# Patient Record
Sex: FEMALE | Race: WHITE | NOT HISPANIC OR LATINO | Employment: FULL TIME | URBAN - METROPOLITAN AREA
[De-identification: names, ages, dates, MRNs, and addresses within clinical notes are randomized per-mention and may not be internally consistent; named-entity substitution may affect disease eponyms.]

---

## 2017-01-06 ENCOUNTER — GENERIC CONVERSION - ENCOUNTER (OUTPATIENT)
Dept: OTHER | Facility: OTHER | Age: 57
End: 2017-01-06

## 2019-01-10 ENCOUNTER — OFFICE VISIT (OUTPATIENT)
Dept: FAMILY MEDICINE CLINIC | Facility: CLINIC | Age: 59
End: 2019-01-10
Payer: COMMERCIAL

## 2019-01-10 VITALS
HEIGHT: 69 IN | SYSTOLIC BLOOD PRESSURE: 150 MMHG | WEIGHT: 209 LBS | TEMPERATURE: 98.2 F | BODY MASS INDEX: 30.96 KG/M2 | DIASTOLIC BLOOD PRESSURE: 102 MMHG

## 2019-01-10 DIAGNOSIS — E78.5 HYPERLIPIDEMIA, UNSPECIFIED HYPERLIPIDEMIA TYPE: ICD-10-CM

## 2019-01-10 DIAGNOSIS — I10 ESSENTIAL HYPERTENSION: ICD-10-CM

## 2019-01-10 DIAGNOSIS — F43.9 STRESS: ICD-10-CM

## 2019-01-10 DIAGNOSIS — E66.9 OBESITY (BMI 30-39.9): Primary | ICD-10-CM

## 2019-01-10 DIAGNOSIS — R42 LIGHTHEADEDNESS: ICD-10-CM

## 2019-01-10 PROCEDURE — 99214 OFFICE O/P EST MOD 30 MIN: CPT | Performed by: FAMILY MEDICINE

## 2019-01-10 RX ORDER — HYDROCHLOROTHIAZIDE 12.5 MG/1
12.5 TABLET ORAL DAILY
Qty: 30 TABLET | Refills: 5 | Status: SHIPPED | OUTPATIENT
Start: 2019-01-10 | End: 2019-05-09

## 2019-01-10 NOTE — PATIENT INSTRUCTIONS
Here to review labs showing elevated LDL at 172, since HDL is high, will give 6 months of diet and lifestyle changes to see if LDL can be decreased and will rec  Nutrition and weight counseling for patient  Start HCTZ 12 5 mg daily and recheck in 1 month  Lose weight and call if worsening dizziness or lightheadedness  Consider cholesterol agent if lipid panel not improved in 6 months  Gave patient low cholesterol diet sheet

## 2019-01-10 NOTE — PROGRESS NOTES
Assessment/Plan:  Chief Complaint   Patient presents with    Dizziness     for about 1 month; no other symptoms indicated    Immunizations     flu     Patient Instructions   Here to review labs showing elevated LDL at 172, since HDL is high, will give 6 months of diet and lifestyle changes to see if LDL can be decreased and will rec  Nutrition and weight counseling for patient  Start HCTZ 12 5 mg daily and recheck in 1 month  Lose weight and call if worsening dizziness or lightheadedness  Consider cholesterol agent if lipid panel not improved in 6 months  Gave patient low cholesterol diet sheet  No problem-specific Assessment & Plan notes found for this encounter  Diagnoses and all orders for this visit:    Obesity (BMI 30-39 9)  -     CBC and differential; Future    Essential hypertension  -     hydrochlorothiazide (HYDRODIURIL) 12 5 mg tablet; Take 1 tablet (12 5 mg total) by mouth daily  -     CBC and differential; Future    Hyperlipidemia, unspecified hyperlipidemia type  -     Comprehensive metabolic panel; Future  -     Lipid Panel with Direct LDL reflex; Future  -     CBC and differential; Future    Lightheadedness  -     CBC and differential; Future    Stress  -     CBC and differential; Future          Subjective:      Patient ID: Ines Carter is a 66 y o  female  Dizziness (for about 1 month; no other symptoms indicated)  Immunizations (flu)    Stress at home and work and daughter leaving for [place] and house is a mess and takes care of her mother   has been checking her BP and consistently over 140/90  Here to review labs  Wants to see a nutritionist  Labs show hyperlipidemia  LDL is 172  Mother with hyperlipidemia  Currently No cp or sob, or ha, no visual changes   Gets headaches daily however        The following portions of the patient's history were reviewed and updated as appropriate: allergies, current medications, past family history, past medical history, past social history, past surgical history and problem list     Review of Systems   Constitutional:        Obesity   HENT: Negative  Eyes: Negative  Respiratory: Negative  Cardiovascular: Negative  Gastrointestinal: Negative  Endocrine: Negative  Genitourinary: Negative  Musculoskeletal: Negative  Skin: Negative  Allergic/Immunologic: Negative  Neurological: Negative  Hematological: Negative  Psychiatric/Behavioral:        Stress         Objective:      BP (!) 150/102   Temp 98 2 °F (36 8 °C) (Tympanic)   Ht 5' 9" (1 753 m)   Wt 94 8 kg (209 lb)   BMI 30 86 kg/m²          Physical Exam   Constitutional: She is oriented to person, place, and time  She appears well-developed and well-nourished  obesity   HENT:   Head: Normocephalic and atraumatic  Right Ear: External ear normal    Left Ear: External ear normal    Nose: Nose normal    Mouth/Throat: Oropharynx is clear and moist    Eyes: Pupils are equal, round, and reactive to light  Conjunctivae and EOM are normal    Neck: Normal range of motion  Neck supple  Cardiovascular: Normal rate, regular rhythm, normal heart sounds and intact distal pulses  Pulmonary/Chest: Effort normal and breath sounds normal    Musculoskeletal: Normal range of motion  Neurological: She is alert and oriented to person, place, and time  She has normal reflexes  Skin: Skin is warm and dry  Psychiatric: She has a normal mood and affect   Her behavior is normal

## 2019-01-25 ENCOUNTER — OFFICE VISIT (OUTPATIENT)
Dept: BARIATRICS | Facility: CLINIC | Age: 59
End: 2019-01-25
Payer: COMMERCIAL

## 2019-01-25 VITALS
RESPIRATION RATE: 18 BRPM | TEMPERATURE: 98.4 F | HEART RATE: 88 BPM | WEIGHT: 210.9 LBS | BODY MASS INDEX: 31.24 KG/M2 | SYSTOLIC BLOOD PRESSURE: 130 MMHG | DIASTOLIC BLOOD PRESSURE: 84 MMHG | HEIGHT: 69 IN

## 2019-01-25 DIAGNOSIS — E66.9 OBESITY (BMI 30-39.9): ICD-10-CM

## 2019-01-25 DIAGNOSIS — R73.01 ELEVATED FASTING GLUCOSE: ICD-10-CM

## 2019-01-25 DIAGNOSIS — R63.5 ABNORMAL WEIGHT GAIN: ICD-10-CM

## 2019-01-25 DIAGNOSIS — I10 HYPERTENSION, UNSPECIFIED TYPE: ICD-10-CM

## 2019-01-25 DIAGNOSIS — E66.9 OBESITY, CLASS I, BMI 30-34.9: Primary | ICD-10-CM

## 2019-01-25 DIAGNOSIS — G47.30 SLEEP APNEA, UNSPECIFIED TYPE: ICD-10-CM

## 2019-01-25 PROBLEM — E66.811 OBESITY, CLASS I, BMI 30-34.9: Status: ACTIVE | Noted: 2019-01-25

## 2019-01-25 PROCEDURE — 99242 OFF/OP CONSLTJ NEW/EST SF 20: CPT | Performed by: PHYSICIAN ASSISTANT

## 2019-01-25 NOTE — ASSESSMENT & PLAN NOTE
-Discussed options of [facility]ensive Lifestyle Intervention Program, Very Low Calorie Diet-VLCD and Conservative Program and the role of weight loss medications   -Initial weight loss goal of 5-10% weight loss for improved health  -Screening labs  Recommend checking lab coverage before having labs drawn   -lipid and cmp reviewed from 1/4/19 all within acceptable limits except elevated fasting glucose, cholesterol and ldl   Needs tsh, a1c, and fasting insulin   -Patient is interested in pursuing [facility]vention Program

## 2019-01-25 NOTE — PROGRESS NOTES
Assessment/Plan:    Obesity, Class I, BMI 30-34 9  -Discussed options of [facility] Lifestyle Intervention Program, Very Low Calorie Diet-VLCD and Conservative Program and the role of weight loss medications   -Initial weight loss goal of 5-10% weight loss for improved health  -Screening labs  Recommend checking lab coverage before having labs drawn   -lipid and cmp reviewed from 1/4/19 all within acceptable limits except elevated fasting glucose, cholesterol and ldl  Needs tsh, a1c, and fasting insulin   -Patient is interested in pursuing [facility]estyle Intervention Program        Abnormal weight gain  See obesity plan     Sleep apnea  -encouraged continued use of CPAP machine  -may improve with 20-30% weight loss      Hypertension  Taking hctz  -should improve with weight loss, dietary, and lifestyle changes  -continue management with prescribing provider        Follow up for [facility]     Diagnoses and all orders for this visit:    Obesity, Class I, BMI 30-34 9  -     TSH, 3rd generation with Free T4 reflex; Future  -     Hemoglobin A1C; Future  -     Insulin, fasting; Future    Obesity (BMI 30-39 9)  -     Ambulatory referral to Weight Management    Abnormal weight gain  -     TSH, 3rd generation with Free T4 reflex; Future  -     Hemoglobin A1C; Future  -     Insulin, fasting; Future    Sleep apnea, unspecified type  -     TSH, 3rd generation with Free T4 reflex; Future  -     Hemoglobin A1C; Future  -     Insulin, fasting; Future    Hypertension, unspecified type  -     TSH, 3rd generation with Free T4 reflex; Future  -     Hemoglobin A1C; Future  -     Insulin, fasting; Future    Elevated fasting glucose  -     TSH, 3rd generation with Free T4 reflex; Future  -     Hemoglobin A1C; Future  -     Insulin, fasting; Future          Subjective:   Chief Complaint   Patient presents with    Consult     Pt is in the office for mwm consult  Patient ID: Binta Buchanan  is a 66 y o  female with excess weight/obesity here to pursue weight management  Past Medical History:   Diagnosis Date    Hypertension     Sleep apnea        HPI:  Obesity/Excess Weight:  Severity: Moderate  Onset: Within the last year     Modifiers: hasn't tried to lose weight in the past   Contributing factors: Menopause  Associated symptoms: comorbid conditions    Goals: get off blood pressure medication   Hydration: 32 ounces of water, 2 cups of black coffee, 1 cup of green tea   Alcohol: 4 glasses of wine per week     The following portions of the patient's history were reviewed and updated as appropriate: allergies, current medications, past family history, past medical history, past social history, past surgical history and problem list     Review of Systems   HENT: Negative for sore throat  Respiratory: Negative for cough and shortness of breath  Cardiovascular: Negative for chest pain and palpitations  Gastrointestinal: Negative for abdominal pain, constipation, diarrhea, nausea and vomiting  Denies GERD   Endocrine: Negative for cold intolerance and heat intolerance  Genitourinary: Negative for dysuria  Musculoskeletal: Negative for arthralgias and back pain  Skin: Negative for rash  Neurological: Negative for headaches  Psychiatric/Behavioral: Negative for suicidal ideas (denies HI)  Denies depression or anxiety       Objective:    /84 (BP Location: Left arm, Patient Position: Sitting, Cuff Size: Large)   Pulse 88   Temp 98 4 °F (36 9 °C) (Tympanic)   Resp 18   Ht 5' 9" (1 753 m)   Wt 95 7 kg (210 lb 14 4 oz)   BMI 31 14 kg/m²     Physical Exam   Nursing note and vitals reviewed  Constitutional   General appearance: Abnormal   well developed and obese  Eyes No conjunctival pallor  Ears, Nose, Mouth, and Throat Oral mucosa moist    Pulmonary   Respiratory effort: No increased work of breathing or signs of respiratory distress      Auscultation of lungs: Clear to auscultation, equal breath sounds bilaterally, no wheezes, no rales, no rhonci  Cardiovascular   Auscultation of heart: Normal rate and rhythm, normal S1 and S2, without murmurs  Examination of extremities for edema and/or varicosities: Normal   no edema  Abdomen   Abdomen: Abnormal   The abdomen was obese  Bowel sounds were normal  The abdomen was soft and nontender     Musculoskeletal   Gait and station: Normal     Psychiatric   Orientation to person, place and time: Normal     Affect: appropriate

## 2019-01-25 NOTE — ASSESSMENT & PLAN NOTE
Taking hctz  -should improve with weight loss, dietary, and lifestyle changes  -continue management with prescribing provider

## 2019-01-31 ENCOUNTER — OFFICE VISIT (OUTPATIENT)
Dept: BARIATRICS | Facility: CLINIC | Age: 59
End: 2019-01-31

## 2019-01-31 VITALS — HEIGHT: 69 IN | BODY MASS INDEX: 30.75 KG/M2 | WEIGHT: 207.6 LBS

## 2019-01-31 DIAGNOSIS — R63.5 ABNORMAL WEIGHT GAIN: ICD-10-CM

## 2019-01-31 LAB
HBA1C MFR BLD: 5.3 % OF TOTAL HGB
INSULIN SERPL-ACNC: 5.7 UIU/ML (ref 2–19.6)
TSH SERPL-ACNC: 2.71 MIU/L (ref 0.4–4.5)

## 2019-01-31 PROCEDURE — RECHECK

## 2019-01-31 PROCEDURE — WMPRO12

## 2019-01-31 NOTE — PROGRESS NOTES
Weight Management Medical Nutrition Assessment    Neisha Quiroga is here for [facility] new start  Seen by PA last week  Since that time has made changes to her intake and has lost 3 3 lbs Goals for the program include getting medication as well as losing 30 lbs  Discussed realistic goals for 12 wks and emphasized recommendation of 5-10%  Food recall reveals good sources of protein but she is not yet measuring portions  Also reports grazing most of the day  Discussed importance of structured interval eating vs grazing  Having a defined snack will be beneficial for her  She will also work on water intake  Currently exercising at a gym 2x/wk but wanting to incorporate some home exercise as well  Anthropometric Measurements  Start Weight (lbs): 207 6 lbs   Ideal Body Weight (lbs): 145 lbs  Goal Weight (lbs):  lbs  Highest: 208 lbs  Lowest: 170-175 lbs  UBW: 180 lbs    Weight Loss History  Previous weight loss attempts: n/a    Food and Nutrition Related History  Wake up: 7:00  Bed Time:10-11    Food Recall  Breakfast:9:00: siggis 120, 15, banana OR 1 rye toast, 1 pb   Snack: almonds, fruit, fruit snacks, string cheese  Lunch: 11:30: ~35 calbag salad, 2 5 oz tuna or 3-4 oz chicken/filet, ~1/4 cup cheese, small amount of dressing  Snack: grazing as above, Juice Plus  Dinner: 6:30: ~3-4 oz protein, ~3/4 cup carb, 1 cup veg  Snack: skip    Beverages: water, ~16 oz coffee, unsweetened green tea, red wine   Volume of beverage intake: 32 oz water, 6 oz wine (more on the w/e)    Weekends: Improved, because home  Cravings: n/a  Trouble area of day: midafternoon    Frequency of Eating out: 1-2x/wk  Food restrictions: dislike garlic, fish  Cooking:spouse  Food Shopping: self    Physical Activity Intake  Activity:Strength Training and cardio  Frequency:2x/wk  Physical limitations/barriers to exercise: n/a    Estimated Needs  Energy  [place]: BMR: 1665     X 1 3 -1000 =1165  [employer] [place] [employer] Needs:  BMR : 0328  1-2# loss weekly sedentary:  915-1415       1-2# loss weekly lightly active: 1490-2046  Protein: 79-99      (1 2-1 5g/kg IBW)  Fluid: 72 oz  (35mL/kg IBW)    Nutrition Diagnosis  Yes; Overweight/obesity  related to Excess energy intake as evidenced by  BMI more than normative standard for age and sex (obesity-grade I 26-30  9)       Nutrition Intervention    Nutrition Prescription  Calories:7507-7422  Protein:    Meal Plan  Breakfast: 250, 12-15  Snack: 100-150, 5-10  Lunch: 350-500, 27-35  Snack 150, 5-10  Dinner: 350-500, 25-33  Snack: skip    Nutrition Education:    [facility] Manual  Calorie controlled menu  Lean protein food choices  Healthy snack options  Food journaling tips    Nutrition Counseling:  Strategies: meal planning, portion sizes, healthy snack choices, hydration, fiber intake, protein intake, exercise, food journal    Monitoring and Evaluation:  Evaluation criteria:  Energy Intake  Meet protein needs  Maintain adequate hydration  Monitor weekly weight  Meal planning/preparation  Food journal   Decreased portions at mealtimes and snacks  Physical activity     Barriers to learning:none  Readiness to change: Action  Comprehension: very good  Expected Compliance: very good

## 2019-02-07 ENCOUNTER — CLINICAL SUPPORT (OUTPATIENT)
Dept: BARIATRICS | Facility: CLINIC | Age: 59
End: 2019-02-07

## 2019-02-07 VITALS — WEIGHT: 210.4 LBS | HEIGHT: 69 IN | BODY MASS INDEX: 31.16 KG/M2

## 2019-02-07 DIAGNOSIS — R63.5 ABNORMAL WEIGHT GAIN: Primary | ICD-10-CM

## 2019-02-07 PROCEDURE — RECHECK

## 2019-02-08 ENCOUNTER — TELEPHONE (OUTPATIENT)
Dept: FAMILY MEDICINE CLINIC | Facility: CLINIC | Age: 59
End: 2019-02-08

## 2019-02-14 ENCOUNTER — CLINICAL SUPPORT (OUTPATIENT)
Dept: BARIATRICS | Facility: CLINIC | Age: 59
End: 2019-02-14

## 2019-02-14 VITALS — WEIGHT: 211.4 LBS | BODY MASS INDEX: 31.31 KG/M2 | HEIGHT: 69 IN

## 2019-02-14 DIAGNOSIS — R63.5 ABNORMAL WEIGHT GAIN: Primary | ICD-10-CM

## 2019-02-14 PROCEDURE — RECHECK

## 2019-02-19 ENCOUNTER — OFFICE VISIT (OUTPATIENT)
Dept: BARIATRICS | Facility: CLINIC | Age: 59
End: 2019-02-19

## 2019-02-19 VITALS — BODY MASS INDEX: 30.79 KG/M2 | WEIGHT: 207.9 LBS | HEIGHT: 69 IN

## 2019-02-19 DIAGNOSIS — R63.5 ABNORMAL WEIGHT GAIN: Primary | ICD-10-CM

## 2019-02-19 PROCEDURE — RECHECK

## 2019-02-19 NOTE — PROGRESS NOTES
Weight Management Medical Nutrition Assessment    Kellie Fontenot is here for f/u  Current wt 207 9 lbs, stable over the last 3 wks but loss of 3 5 lbs x 1 wk  After starting program had initial gain of 3 8 lbs  Doing better with her water intake, drinking much more than prior and notices positive changes  No longer grazing throughout the day and instead having consistent snacks which have also been helpful  Has begun walking 15 minutes per day at work in addition to the gym 2x/wk  Food logging on paper and has decreased portion size of carb at dinner  Anthropometric Measurements  Start Weight (lbs): 207 6 lbs  Today's Weight: 207 9 lbs   Ideal Body Weight (lbs): 145 lbs  Goal Weight (lbs):  lbs  Highest: 211 lbs  Lowest: 170-175 lbs  UBW: 180 lbs    Weight Loss History  Previous weight loss attempts: n/a    Food and Nutrition Related History  Wake up: 7:00  Bed Time:10-11    Food Recall  Breakfast:9:00: siggis 120, 15 OR 1 rye toast, 1 pb   Snack: 1/4 cup cashews or 2 crackers w/guac single serving  Lunch: 11:30: soup with protein  Snack: as above  Dinner: 6:30: ~3-4 oz lean protein, ~1/2 cup carb, >1 cup veg  Snack: skip    Beverages: water, ~16 oz coffee, unsweetened green tea, red wine   Volume of beverage intake: 64 oz water, 6 oz wine  On w/e    Weekends: Improved, because home  Cravings: n/a  Trouble area of day: midafternoon    Frequency of Eating out: 1-2x/wk  Food restrictions: dislike garlic, fish  Cooking:spouse  Food Shopping: self    Physical Activity Intake  Activity:Strength Training and cardio, also walking 15 minutes at lunch  Frequency:2x/wk  Physical limitations/barriers to exercise: n/a    Estimated Needs  Energy  [place]: BMR: 1665     X 1 3 -1000 =1165  [employer] [place] Energy Needs:  BMR : 0933  1-2# loss weekly sedentary:  915-1415       1-2# loss weekly lightly active: 2029-9430  Protein: 79-99      (1 2-1 5g/kg IBW)  Fluid: 72 oz  (35mL/kg IBW)    Nutrition Diagnosis  Yes; Overweight/obesity  related to Excess energy intake as evidenced by  BMI more than normative standard for age and sex (obesity-grade I 26-30  9)       Nutrition Intervention    Nutrition Prescription  Calories:3469-6390  Protein:    Meal Plan  Breakfast: 250, 12-15  Snack: 100-150, 5-10  Lunch: 350-500, 27-35  Snack 150, 5-10  Dinner: 350-500, 25-33  Snack: skip    Nutrition Education:    [facility] Manual  Calorie controlled menu  Lean protein food choices  Healthy snack options  Food journaling tips    Nutrition Counseling:  Strategies: meal planning, portion sizes, healthy snack choices, hydration, fiber intake, protein intake, exercise, food journal    Monitoring and Evaluation:  Evaluation criteria:  Energy Intake  Meet protein needs  Maintain adequate hydration  Monitor weekly weight  Meal planning/preparation  Food journal   Decreased portions at mealtimes and snacks  Physical activity     Barriers to learning:none  Readiness to change: Action  Comprehension: very good  Expected Compliance: good

## 2019-02-21 ENCOUNTER — CLINICAL SUPPORT (OUTPATIENT)
Dept: BARIATRICS | Facility: CLINIC | Age: 59
End: 2019-02-21

## 2019-02-21 VITALS — HEIGHT: 69 IN | BODY MASS INDEX: 31.13 KG/M2 | WEIGHT: 210.2 LBS

## 2019-02-21 DIAGNOSIS — R63.5 ABNORMAL WEIGHT GAIN: Primary | ICD-10-CM

## 2019-02-21 PROCEDURE — RECHECK

## 2019-02-28 ENCOUNTER — CLINICAL SUPPORT (OUTPATIENT)
Dept: BARIATRICS | Facility: CLINIC | Age: 59
End: 2019-02-28

## 2019-02-28 VITALS — WEIGHT: 207 LBS | HEIGHT: 69 IN | BODY MASS INDEX: 30.66 KG/M2

## 2019-02-28 DIAGNOSIS — R63.5 ABNORMAL WEIGHT GAIN: Primary | ICD-10-CM

## 2019-02-28 PROCEDURE — RECHECK

## 2019-03-01 ENCOUNTER — OFFICE VISIT (OUTPATIENT)
Dept: FAMILY MEDICINE CLINIC | Facility: CLINIC | Age: 59
End: 2019-03-01
Payer: COMMERCIAL

## 2019-03-01 ENCOUNTER — TELEPHONE (OUTPATIENT)
Dept: FAMILY MEDICINE CLINIC | Facility: CLINIC | Age: 59
End: 2019-03-01

## 2019-03-01 VITALS
BODY MASS INDEX: 30.6 KG/M2 | WEIGHT: 206.6 LBS | HEIGHT: 69 IN | SYSTOLIC BLOOD PRESSURE: 128 MMHG | DIASTOLIC BLOOD PRESSURE: 84 MMHG

## 2019-03-01 DIAGNOSIS — E78.5 HYPERLIPIDEMIA, UNSPECIFIED HYPERLIPIDEMIA TYPE: ICD-10-CM

## 2019-03-01 DIAGNOSIS — I10 HYPERTENSION, UNSPECIFIED TYPE: ICD-10-CM

## 2019-03-01 DIAGNOSIS — E66.9 OBESITY, CLASS I, BMI 30-34.9: Primary | ICD-10-CM

## 2019-03-01 DIAGNOSIS — R42 DIZZINESS: ICD-10-CM

## 2019-03-01 PROCEDURE — 3074F SYST BP LT 130 MM HG: CPT | Performed by: FAMILY MEDICINE

## 2019-03-01 PROCEDURE — 1036F TOBACCO NON-USER: CPT | Performed by: FAMILY MEDICINE

## 2019-03-01 PROCEDURE — 3008F BODY MASS INDEX DOCD: CPT | Performed by: FAMILY MEDICINE

## 2019-03-01 PROCEDURE — 99214 OFFICE O/P EST MOD 30 MIN: CPT | Performed by: FAMILY MEDICINE

## 2019-03-01 PROCEDURE — 3079F DIAST BP 80-89 MM HG: CPT | Performed by: FAMILY MEDICINE

## 2019-03-01 NOTE — PROGRESS NOTES
Assessment/Plan:  Chief Complaint   Patient presents with    Follow-up    Hypertension     Patient Instructions   Doing well with BP med and will continue same BP med as directed  Lose weight and call if any problems  Hx of elevated LDL at 172, since HDL is high, will give 6 months of diet and lifestyle changes to see if LDL can be decreased and will rec  Continuing Nutrition and weight counseling for patient  Continue HCTZ 12 5 mg daily and recheck in 3 month  Lose weight and call if any dizziness or lightheadedness, this is much better  Consider cholesterol agent if lipid panel not improved in 6 months  Gave patient low cholesterol diet sheet in past              No problem-specific Assessment & Plan notes found for this encounter  Diagnoses and all orders for this visit:    Obesity, Class I, BMI 30-34 9    Hypertension, unspecified type    Dizziness    Hyperlipidemia, unspecified hyperlipidemia type    Other orders  -     Omega-3 Fatty Acids (FISH OIL PO); Take 2 g by mouth  -     Cholecalciferol 2000 units CAPS; Take 1 capsule by mouth          Subjective:      Patient ID: Coty Sparks is a 66 y o  female  Here for bp check and also lost 4 pounds  No cp or sob, or ha  The following portions of the patient's history were reviewed and updated as appropriate: allergies, current medications, past family history, past medical history, past social history, past surgical history and problem list     Review of Systems   Constitutional:        Obesity   HENT: Negative  Eyes: Negative  Respiratory: Negative  Cardiovascular: Negative  Gastrointestinal: Negative  Endocrine: Negative  Genitourinary: Negative  Musculoskeletal: Negative  Skin: Negative  Allergic/Immunologic: Negative  Neurological: Negative  Hematological: Negative  Psychiatric/Behavioral: Negative            Objective:      /84   Ht 5' 9" (1 753 m)   Wt 93 7 kg (206 lb 9 6 oz)   BMI 30 51 kg/m² Physical Exam   Constitutional: She is oriented to person, place, and time  She appears well-developed and well-nourished  obesity   HENT:   Head: Normocephalic and atraumatic  Right Ear: External ear normal    Left Ear: External ear normal    Nose: Nose normal    Mouth/Throat: Oropharynx is clear and moist    Eyes: Pupils are equal, round, and reactive to light  Conjunctivae and EOM are normal    Neck: Normal range of motion  Neck supple  Cardiovascular: Normal rate, regular rhythm, normal heart sounds and intact distal pulses  Pulmonary/Chest: Effort normal and breath sounds normal    Musculoskeletal: Normal range of motion  Neurological: She is alert and oriented to person, place, and time  She has normal reflexes  Skin: Skin is warm and dry  Psychiatric: She has a normal mood and affect   Her behavior is normal

## 2019-03-01 NOTE — PATIENT INSTRUCTIONS
Doing well with BP med and will continue same BP med as directed  Lose weight and call if any problems  Hx of elevated LDL at 172, since HDL is high, will give 6 months of diet and lifestyle changes to see if LDL can be decreased and will rec  Continuing Nutrition and weight counseling for patient  Continue HCTZ 12 5 mg daily and recheck in 3 month  Lose weight and call if any dizziness or lightheadedness, this is much better  Consider cholesterol agent if lipid panel not improved in 6 months   Gave patient low cholesterol diet sheet in past

## 2019-03-01 NOTE — TELEPHONE ENCOUNTER
Patient gave me a [employer] form for Dr Camacho Romero to complete with the dates 03/1-04/25  The old one is attached to just copy the information  Asking for it to be mailed to her when completed       Placed in Dr Meeta haines

## 2019-03-14 ENCOUNTER — CLINICAL SUPPORT (OUTPATIENT)
Dept: BARIATRICS | Facility: CLINIC | Age: 59
End: 2019-03-14

## 2019-03-14 VITALS — BODY MASS INDEX: 30.45 KG/M2 | WEIGHT: 205.6 LBS | HEIGHT: 69 IN

## 2019-03-14 DIAGNOSIS — R63.5 ABNORMAL WEIGHT GAIN: Primary | ICD-10-CM

## 2019-03-14 PROCEDURE — RECHECK

## 2019-03-25 ENCOUNTER — TELEPHONE (OUTPATIENT)
Dept: FAMILY MEDICINE CLINIC | Facility: CLINIC | Age: 59
End: 2019-03-25

## 2019-03-25 DIAGNOSIS — G47.33 OSA (OBSTRUCTIVE SLEEP APNEA): Primary | ICD-10-CM

## 2019-03-28 ENCOUNTER — CLINICAL SUPPORT (OUTPATIENT)
Dept: BARIATRICS | Facility: CLINIC | Age: 59
End: 2019-03-28

## 2019-03-28 VITALS — WEIGHT: 206.2 LBS | HEIGHT: 69 IN | BODY MASS INDEX: 30.54 KG/M2

## 2019-03-28 DIAGNOSIS — R63.5 ABNORMAL WEIGHT GAIN: Primary | ICD-10-CM

## 2019-03-28 PROCEDURE — RECHECK

## 2019-03-28 NOTE — TELEPHONE ENCOUNTER
Patient called and stated she needs "replacement parts" such as: face mask, filters, hose/tubing for under the nose

## 2019-04-04 ENCOUNTER — CLINICAL SUPPORT (OUTPATIENT)
Dept: BARIATRICS | Facility: CLINIC | Age: 59
End: 2019-04-04

## 2019-04-04 VITALS — BODY MASS INDEX: 30.57 KG/M2 | WEIGHT: 206.4 LBS | HEIGHT: 69 IN

## 2019-04-04 DIAGNOSIS — R63.5 ABNORMAL WEIGHT GAIN: Primary | ICD-10-CM

## 2019-04-04 PROCEDURE — RECHECK

## 2019-04-16 ENCOUNTER — OFFICE VISIT (OUTPATIENT)
Dept: BARIATRICS | Facility: CLINIC | Age: 59
End: 2019-04-16

## 2019-04-16 VITALS — BODY MASS INDEX: 30.57 KG/M2 | WEIGHT: 206.4 LBS | HEIGHT: 69 IN

## 2019-04-16 DIAGNOSIS — R63.5 ABNORMAL WEIGHT GAIN: Primary | ICD-10-CM

## 2019-04-16 PROCEDURE — RECHECK

## 2019-04-18 ENCOUNTER — PATIENT MESSAGE (OUTPATIENT)
Dept: FAMILY MEDICINE CLINIC | Facility: CLINIC | Age: 59
End: 2019-04-18

## 2019-05-09 ENCOUNTER — OFFICE VISIT (OUTPATIENT)
Dept: FAMILY MEDICINE CLINIC | Facility: CLINIC | Age: 59
End: 2019-05-09
Payer: COMMERCIAL

## 2019-05-09 VITALS
BODY MASS INDEX: 30.13 KG/M2 | SYSTOLIC BLOOD PRESSURE: 120 MMHG | DIASTOLIC BLOOD PRESSURE: 90 MMHG | HEIGHT: 69 IN | WEIGHT: 203.4 LBS

## 2019-05-09 DIAGNOSIS — R51.9 NONINTRACTABLE HEADACHE, UNSPECIFIED CHRONICITY PATTERN, UNSPECIFIED HEADACHE TYPE: ICD-10-CM

## 2019-05-09 DIAGNOSIS — E66.9 OBESITY (BMI 30-39.9): ICD-10-CM

## 2019-05-09 DIAGNOSIS — I10 HYPERTENSION, UNSPECIFIED TYPE: Primary | ICD-10-CM

## 2019-05-09 PROCEDURE — 3008F BODY MASS INDEX DOCD: CPT | Performed by: FAMILY MEDICINE

## 2019-05-09 PROCEDURE — 99214 OFFICE O/P EST MOD 30 MIN: CPT | Performed by: FAMILY MEDICINE

## 2019-05-09 RX ORDER — LISINOPRIL 10 MG/1
10 TABLET ORAL DAILY
Qty: 90 TABLET | Refills: 3 | Status: SHIPPED | OUTPATIENT
Start: 2019-05-09 | End: 2019-06-04

## 2019-06-04 ENCOUNTER — OFFICE VISIT (OUTPATIENT)
Dept: FAMILY MEDICINE CLINIC | Facility: CLINIC | Age: 59
End: 2019-06-04
Payer: COMMERCIAL

## 2019-06-04 VITALS
OXYGEN SATURATION: 99 % | WEIGHT: 200.4 LBS | DIASTOLIC BLOOD PRESSURE: 98 MMHG | HEART RATE: 70 BPM | SYSTOLIC BLOOD PRESSURE: 148 MMHG | BODY MASS INDEX: 29.68 KG/M2 | TEMPERATURE: 98.2 F | HEIGHT: 69 IN

## 2019-06-04 DIAGNOSIS — E55.9 VITAMIN D DEFICIENCY: ICD-10-CM

## 2019-06-04 DIAGNOSIS — E66.3 OVERWEIGHT (BMI 25.0-29.9): ICD-10-CM

## 2019-06-04 DIAGNOSIS — I10 ESSENTIAL HYPERTENSION: Primary | ICD-10-CM

## 2019-06-04 DIAGNOSIS — E78.5 HYPERLIPIDEMIA, UNSPECIFIED HYPERLIPIDEMIA TYPE: ICD-10-CM

## 2019-06-04 DIAGNOSIS — G47.30 SLEEP APNEA, UNSPECIFIED TYPE: ICD-10-CM

## 2019-06-04 PROCEDURE — 99214 OFFICE O/P EST MOD 30 MIN: CPT | Performed by: FAMILY MEDICINE

## 2019-06-04 PROCEDURE — 1036F TOBACCO NON-USER: CPT | Performed by: FAMILY MEDICINE

## 2019-06-04 RX ORDER — CANDESARTAN 16 MG/1
16 TABLET ORAL DAILY
Qty: 30 TABLET | Refills: 5 | Status: SHIPPED | OUTPATIENT
Start: 2019-06-04 | End: 2019-12-11 | Stop reason: SDUPTHER

## 2019-07-12 ENCOUNTER — OFFICE VISIT (OUTPATIENT)
Dept: FAMILY MEDICINE CLINIC | Facility: CLINIC | Age: 59
End: 2019-07-12
Payer: COMMERCIAL

## 2019-07-12 VITALS
BODY MASS INDEX: 29.95 KG/M2 | HEIGHT: 69 IN | HEART RATE: 76 BPM | SYSTOLIC BLOOD PRESSURE: 132 MMHG | DIASTOLIC BLOOD PRESSURE: 88 MMHG | TEMPERATURE: 97.9 F | OXYGEN SATURATION: 98 % | WEIGHT: 202.2 LBS

## 2019-07-12 DIAGNOSIS — E66.9 OBESITY, CLASS I, BMI 30-34.9: Primary | ICD-10-CM

## 2019-07-12 DIAGNOSIS — G47.30 SLEEP APNEA, UNSPECIFIED TYPE: ICD-10-CM

## 2019-07-12 DIAGNOSIS — F43.9 STRESS: ICD-10-CM

## 2019-07-12 DIAGNOSIS — I10 HYPERTENSION, UNSPECIFIED TYPE: ICD-10-CM

## 2019-07-12 PROCEDURE — 3008F BODY MASS INDEX DOCD: CPT | Performed by: FAMILY MEDICINE

## 2019-07-12 PROCEDURE — 1036F TOBACCO NON-USER: CPT | Performed by: FAMILY MEDICINE

## 2019-07-12 PROCEDURE — 99214 OFFICE O/P EST MOD 30 MIN: CPT | Performed by: FAMILY MEDICINE

## 2019-07-12 PROCEDURE — 3075F SYST BP GE 130 - 139MM HG: CPT | Performed by: FAMILY MEDICINE

## 2019-07-12 NOTE — PATIENT INSTRUCTIONS
HTN stable, continue Atacand 16 mg daily and use CPAP for CRISTELA and stress management encouraged  Lose weight as directed  Call if any problems  Recheck in 3 months

## 2019-07-12 NOTE — PROGRESS NOTES
Assessment/Plan:  Chief Complaint   Patient presents with    Hypertension     Here for 1 month re check BP     Patient Instructions   HTN stable, continue Atacand 16 mg daily and use CPAP for CRISTELA and stress management encouraged  Lose weight as directed  Call if any problems  Recheck in 3 months  No problem-specific Assessment & Plan notes found for this encounter  Diagnoses and all orders for this visit:    Obesity, Class I, BMI 30-34 9    Hypertension, unspecified type    Sleep apnea, unspecified type    Stress          Subjective:      Patient ID: Torey Leyva is a 66 y o  female  Here for bP check and has a right sided frontal headache  Has CRISTELA and uses CPAP  Trying to  lose weight and takes BP med as directed  Going to [place] for vacation  The following portions of the patient's history were reviewed and updated as appropriate: allergies, current medications, past family history, past medical history, past social history, past surgical history and problem list     Review of Systems   Constitutional:        Overweight   HENT: Negative  Eyes: Negative  Respiratory: Negative  Cardiovascular: Negative  Gastrointestinal: Negative  Endocrine: Negative  Genitourinary: Negative  Musculoskeletal: Negative  Skin: Negative  Allergic/Immunologic: Negative  Neurological: Positive for headaches  Hematological: Negative  Psychiatric/Behavioral: Negative  Objective:      /88   Pulse 76   Temp 97 9 °F (36 6 °C) (Temporal)   Ht 5' 9" (1 753 m)   Wt 91 7 kg (202 lb 3 2 oz)   SpO2 98%   BMI 29 86 kg/m²          Physical Exam   Constitutional: She is oriented to person, place, and time  She appears well-developed and well-nourished  overweight   HENT:   Head: Normocephalic and atraumatic     Right Ear: External ear normal    Left Ear: External ear normal    Nose: Nose normal    Mouth/Throat: Oropharynx is clear and moist    Eyes: Pupils are equal, round, and reactive to light  Conjunctivae and EOM are normal    Neck: Normal range of motion  Neck supple  Cardiovascular: Normal rate, regular rhythm, normal heart sounds and intact distal pulses  Pulmonary/Chest: Effort normal and breath sounds normal    Musculoskeletal: Normal range of motion  Neurological: She is alert and oriented to person, place, and time  She has normal reflexes  Skin: Skin is warm and dry  Psychiatric: She has a normal mood and affect   Her behavior is normal

## 2019-10-22 ENCOUNTER — OFFICE VISIT (OUTPATIENT)
Dept: FAMILY MEDICINE CLINIC | Facility: CLINIC | Age: 59
End: 2019-10-22
Payer: COMMERCIAL

## 2019-10-22 VITALS
DIASTOLIC BLOOD PRESSURE: 62 MMHG | OXYGEN SATURATION: 96 % | SYSTOLIC BLOOD PRESSURE: 110 MMHG | BODY MASS INDEX: 30.75 KG/M2 | HEIGHT: 69 IN | HEART RATE: 76 BPM | WEIGHT: 207.6 LBS | TEMPERATURE: 97.8 F

## 2019-10-22 DIAGNOSIS — Z23 ENCOUNTER FOR IMMUNIZATION: ICD-10-CM

## 2019-10-22 DIAGNOSIS — E66.9 OBESITY, CLASS I, BMI 30-34.9: ICD-10-CM

## 2019-10-22 DIAGNOSIS — G47.30 SLEEP APNEA, UNSPECIFIED TYPE: ICD-10-CM

## 2019-10-22 DIAGNOSIS — I10 HYPERTENSION, UNSPECIFIED TYPE: Primary | ICD-10-CM

## 2019-10-22 PROCEDURE — 3074F SYST BP LT 130 MM HG: CPT | Performed by: FAMILY MEDICINE

## 2019-10-22 PROCEDURE — 99214 OFFICE O/P EST MOD 30 MIN: CPT | Performed by: FAMILY MEDICINE

## 2019-10-22 PROCEDURE — 3008F BODY MASS INDEX DOCD: CPT | Performed by: FAMILY MEDICINE

## 2019-10-22 PROCEDURE — 90471 IMMUNIZATION ADMIN: CPT | Performed by: FAMILY MEDICINE

## 2019-10-22 PROCEDURE — 3078F DIAST BP <80 MM HG: CPT | Performed by: FAMILY MEDICINE

## 2019-10-22 PROCEDURE — 90682 RIV4 VACC RECOMBINANT DNA IM: CPT | Performed by: FAMILY MEDICINE

## 2019-10-22 RX ORDER — MELOXICAM 15 MG/1
15 TABLET ORAL DAILY
Refills: 0 | COMMUNITY
Start: 2019-08-08 | End: 2020-08-24 | Stop reason: ALTCHOICE

## 2019-10-22 NOTE — PATIENT INSTRUCTIONS
Recheck in 3 months for BP check and lose weight and use CPAP machine as directed  Call if any problems and use stress management to help lower BP  F-up with GYN and get mammogram as directed  Mammo is scheduled October 31, 2019

## 2019-10-22 NOTE — PROGRESS NOTES
Assessment/Plan:  Chief Complaint   Patient presents with    Follow-up    blood pressure check     Patient Instructions   Recheck in 3 months for BP check and lose weight and use CPAP machine as directed  Call if any problems and use stress management to help lower BP  F-up with GYN and get mammogram as directed  Mammo is scheduled October 31, 2019  No problem-specific Assessment & Plan notes found for this encounter  Diagnoses and all orders for this visit:    Hypertension, unspecified type    Sleep apnea, unspecified type    Obesity, Class I, BMI 30-34 9    Other orders  -     meloxicam (MOBIC) 15 mg tablet; Take 15 mg by mouth daily          Subjective:      Patient ID: Patricia Rojo is a 64 y o  female  Follow-up   blood pressure check     No cp or sob, or ha  Has some stress at work  Takes CPAP and also is doing well with CRISTELA  The following portions of the patient's history were reviewed and updated as appropriate: allergies, current medications, past family history, past medical history, past social history, past surgical history and problem list     Review of Systems   Constitutional:        Obesity   HENT: Negative  Eyes: Negative  Respiratory: Negative  Cardiovascular: Negative  Gastrointestinal: Negative  Endocrine: Negative  Genitourinary: Negative  Musculoskeletal: Negative  Skin: Negative  Allergic/Immunologic: Negative  Neurological: Negative  Hematological: Negative  Psychiatric/Behavioral: Negative  Objective:      /62 (BP Location: Left arm, Patient Position: Sitting, Cuff Size: Adult)   Pulse 76   Temp 97 8 °F (36 6 °C) (Temporal)   Ht 5' 9" (1 753 m)   Wt 94 2 kg (207 lb 9 6 oz)   SpO2 96%   BMI 30 66 kg/m²          Physical Exam   Constitutional: She is oriented to person, place, and time  She appears well-developed and well-nourished  obesity   HENT:   Head: Normocephalic and atraumatic     Right Ear: External ear normal    Left Ear: External ear normal    Nose: Nose normal    Mouth/Throat: Oropharynx is clear and moist    Eyes: Pupils are equal, round, and reactive to light  Conjunctivae and EOM are normal    Neck: Normal range of motion  Neck supple  Cardiovascular: Normal rate, regular rhythm, normal heart sounds and intact distal pulses  Pulmonary/Chest: Effort normal and breath sounds normal    Musculoskeletal: Normal range of motion  Neurological: She is alert and oriented to person, place, and time  She has normal reflexes  Skin: Skin is warm and dry  Psychiatric: She has a normal mood and affect   Her behavior is normal

## 2019-11-14 ENCOUNTER — OFFICE VISIT (OUTPATIENT)
Dept: FAMILY MEDICINE CLINIC | Facility: CLINIC | Age: 59
End: 2019-11-14
Payer: COMMERCIAL

## 2019-11-14 VITALS
HEIGHT: 69 IN | TEMPERATURE: 97.9 F | DIASTOLIC BLOOD PRESSURE: 84 MMHG | HEART RATE: 101 BPM | BODY MASS INDEX: 30.84 KG/M2 | OXYGEN SATURATION: 98 % | WEIGHT: 208.2 LBS | SYSTOLIC BLOOD PRESSURE: 138 MMHG

## 2019-11-14 DIAGNOSIS — R05.9 COUGH: ICD-10-CM

## 2019-11-14 DIAGNOSIS — R09.81 NASAL CONGESTION: ICD-10-CM

## 2019-11-14 DIAGNOSIS — I10 HYPERTENSION, UNSPECIFIED TYPE: Primary | ICD-10-CM

## 2019-11-14 DIAGNOSIS — J32.9 SINUSITIS, UNSPECIFIED CHRONICITY, UNSPECIFIED LOCATION: ICD-10-CM

## 2019-11-14 PROCEDURE — 1036F TOBACCO NON-USER: CPT | Performed by: FAMILY MEDICINE

## 2019-11-14 PROCEDURE — 99213 OFFICE O/P EST LOW 20 MIN: CPT | Performed by: FAMILY MEDICINE

## 2019-11-14 RX ORDER — AZITHROMYCIN 250 MG/1
TABLET, FILM COATED ORAL
Qty: 6 TABLET | Refills: 0 | Status: SHIPPED | OUTPATIENT
Start: 2019-11-14 | End: 2019-11-19

## 2019-11-14 NOTE — PATIENT INSTRUCTIONS
Rest and fluids, call if worse, start abx and Dimetapp DM cold and cough, may use humidifier prn  Flonase prn sinusitis  Take BP med as directed

## 2019-11-14 NOTE — PROGRESS NOTES
Assessment/Plan:  Chief Complaint   Patient presents with    Cold Like Symptoms     nasal congestion, headache, sinus issues which started 1 week ago  Patient Instructions   Rest and fluids, call if worse, start abx and Dimetapp DM cold and cough, may use humidifier prn  Flonase prn sinusitis  Take BP med as directed  No problem-specific Assessment & Plan notes found for this encounter  Diagnoses and all orders for this visit:    Hypertension, unspecified type    Cough  -     azithromycin (ZITHROMAX) 250 mg tablet; Take 2 tablets today then 1 tablet daily x 4 days    Sinusitis, unspecified chronicity, unspecified location  -     azithromycin (ZITHROMAX) 250 mg tablet; Take 2 tablets today then 1 tablet daily x 4 days    Nasal congestion          Subjective:      Patient ID: Chinoy Richard is a 64 y o  female  Cold Like Symptoms (nasal congestion, headache, sinus issues which started 1 week ago )      The following portions of the patient's history were reviewed and updated as appropriate: allergies, current medications, past family history, past medical history, past social history, past surgical history and problem list     Review of Systems   Constitutional: Negative  HENT: Positive for congestion, sinus pressure and sinus pain  Eyes: Negative  Respiratory: Positive for cough  Cardiovascular: Negative  Gastrointestinal: Negative  Endocrine: Negative  Genitourinary: Negative  Musculoskeletal: Negative  Skin: Negative  Allergic/Immunologic: Negative  Neurological: Positive for headaches  Hematological: Negative  Psychiatric/Behavioral: Negative  Objective:      /84   Pulse 101   Temp 97 9 °F (36 6 °C) (Temporal)   Ht 5' 9" (1 753 m)   Wt 94 4 kg (208 lb 3 2 oz)   SpO2 98%   BMI 30 75 kg/m²          Physical Exam   Constitutional: She is oriented to person, place, and time  She appears well-developed and well-nourished     HENT:   Head: Normocephalic and atraumatic  Right Ear: External ear normal    Left Ear: External ear normal    Nasal congestion, and pnd   Eyes: Pupils are equal, round, and reactive to light  Conjunctivae and EOM are normal    Neck: Normal range of motion  Neck supple  Cardiovascular: Normal rate, regular rhythm, normal heart sounds and intact distal pulses  Pulmonary/Chest: Effort normal and breath sounds normal    cough   Musculoskeletal: Normal range of motion  Neurological: She is alert and oriented to person, place, and time  She has normal reflexes  Skin: Skin is warm and dry  Psychiatric: She has a normal mood and affect   Her behavior is normal

## 2019-12-11 DIAGNOSIS — I10 ESSENTIAL HYPERTENSION: ICD-10-CM

## 2019-12-11 RX ORDER — CANDESARTAN 16 MG/1
TABLET ORAL
Qty: 30 TABLET | Refills: 0 | Status: SHIPPED | OUTPATIENT
Start: 2019-12-11 | End: 2020-01-10

## 2020-01-03 ENCOUNTER — OFFICE VISIT (OUTPATIENT)
Dept: FAMILY MEDICINE CLINIC | Facility: CLINIC | Age: 60
End: 2020-01-03
Payer: COMMERCIAL

## 2020-01-03 VITALS
RESPIRATION RATE: 17 BRPM | BODY MASS INDEX: 31.58 KG/M2 | HEART RATE: 82 BPM | HEIGHT: 69 IN | OXYGEN SATURATION: 97 % | DIASTOLIC BLOOD PRESSURE: 84 MMHG | TEMPERATURE: 97.3 F | WEIGHT: 213.2 LBS | SYSTOLIC BLOOD PRESSURE: 136 MMHG

## 2020-01-03 DIAGNOSIS — J30.2 SEASONAL ALLERGIES: ICD-10-CM

## 2020-01-03 DIAGNOSIS — I10 HYPERTENSION, UNSPECIFIED TYPE: ICD-10-CM

## 2020-01-03 DIAGNOSIS — E66.9 OBESITY, CLASS I, BMI 30-34.9: Primary | ICD-10-CM

## 2020-01-03 DIAGNOSIS — R51.9 NONINTRACTABLE HEADACHE, UNSPECIFIED CHRONICITY PATTERN, UNSPECIFIED HEADACHE TYPE: ICD-10-CM

## 2020-01-03 DIAGNOSIS — R42 DIZZINESS: ICD-10-CM

## 2020-01-03 PROCEDURE — 99214 OFFICE O/P EST MOD 30 MIN: CPT | Performed by: FAMILY MEDICINE

## 2020-01-03 PROCEDURE — 3008F BODY MASS INDEX DOCD: CPT | Performed by: FAMILY MEDICINE

## 2020-01-03 PROCEDURE — 3079F DIAST BP 80-89 MM HG: CPT | Performed by: FAMILY MEDICINE

## 2020-01-03 PROCEDURE — 3075F SYST BP GE 130 - 139MM HG: CPT | Performed by: FAMILY MEDICINE

## 2020-01-03 RX ORDER — MECLIZINE HYDROCHLORIDE 25 MG/1
25 TABLET ORAL DAILY PRN
Qty: 30 TABLET | Refills: 0 | Status: SHIPPED | OUTPATIENT
Start: 2020-01-03 | End: 2020-08-24 | Stop reason: ALTCHOICE

## 2020-01-03 NOTE — PATIENT INSTRUCTIONS
Here for dizziness and possible BPPV and also has had some right posterior headaches, and also may have sinus issues that are contributing as well as seasonal allergy meds as directed  Start meclizine prn dizziness and check MRI brain for dizziness and right posterior headaches that come and go  Avoid other allergy meds if taking meclizine for dizziness  Consider ENT evaluation if not better or neurology  Take BP med as directed, this is stable  Stress may play a role in dizziness

## 2020-01-03 NOTE — PROGRESS NOTES
Assessment/Plan:  Chief Complaint   Patient presents with    Dizziness     Pt c/o on going dizziness for a month and a half  Pt states she has a Hx of vVertigo  Patient Instructions   Here for dizziness and possible BPPV and also has had some right posterior headaches, and also may have sinus issues that are contributing as well as seasonal allergy meds as directed  Start meclizine prn dizziness and check MRI brain for dizziness and right posterior headaches that come and go  Avoid other allergy meds if taking meclizine for dizziness  Consider ENT evaluation if not better or neurology  Take BP med as directed, this is stable  Stress may play a role in dizziness  No problem-specific Assessment & Plan notes found for this encounter  Diagnoses and all orders for this visit:    Obesity, Class I, BMI 30-34 9    Dizziness  -     meclizine (ANTIVERT) 25 mg tablet; Take 1 tablet (25 mg total) by mouth daily as needed for dizziness    Hypertension, unspecified type    Seasonal allergies    Nonintractable headache, unspecified chronicity pattern, unspecified headache type          Subjective:      Patient ID: Mary Jane Hurtado is a 64 y o  female  Dizziness (Pt c/o on going dizziness for a month and a half  Pt states she has a Hx of vVertigo  ) better today and has dizziness with shaking head  No cp or sob, or ha  Gets headaches at times also and drinking water helps  Gets Headaches right posterior occipital area  No stroke like symptoms or signs  Has had dizziness in past but has not lingered like it is now  Increased stress at work may be causing dizziness or lightheadedness  The following portions of the patient's history were reviewed and updated as appropriate: allergies, current medications, past family history, past medical history, past social history, past surgical history and problem list     Review of Systems   Constitutional: Negative  HENT: Negative  Eyes: Negative      Respiratory: Negative  Cardiovascular: Negative  Gastrointestinal: Negative  Endocrine: Negative  Genitourinary: Negative  Musculoskeletal: Negative  Skin: Negative  Allergic/Immunologic: Negative  Neurological: Positive for dizziness and headaches  Hematological: Negative  Psychiatric/Behavioral: Negative  Objective:      /84 (BP Location: Left arm, Patient Position: Sitting, Cuff Size: Large)   Pulse 82   Temp (!) 97 3 °F (36 3 °C) (Temporal)   Resp 17   Ht 5' 9" (1 753 m)   Wt 96 7 kg (213 lb 3 2 oz)   SpO2 97%   BMI 31 48 kg/m²          Physical Exam   Constitutional: She is oriented to person, place, and time  She appears well-developed and well-nourished  HENT:   Head: Normocephalic and atraumatic  Right Ear: External ear normal    Left Ear: External ear normal    Nose: Nose normal    Mouth/Throat: Oropharynx is clear and moist    Eyes: Pupils are equal, round, and reactive to light  Conjunctivae and EOM are normal    Neck: Normal range of motion  Neck supple  Cardiovascular: Normal rate, regular rhythm, normal heart sounds and intact distal pulses  Pulmonary/Chest: Effort normal and breath sounds normal    Musculoskeletal: Normal range of motion  Neurological: She is alert and oriented to person, place, and time  She has normal reflexes  Skin: Skin is warm and dry  Psychiatric: She has a normal mood and affect   Her behavior is normal

## 2020-01-10 DIAGNOSIS — I10 ESSENTIAL HYPERTENSION: ICD-10-CM

## 2020-01-10 RX ORDER — CANDESARTAN 16 MG/1
TABLET ORAL
Qty: 30 TABLET | Refills: 5 | Status: SHIPPED | OUTPATIENT
Start: 2020-01-10 | End: 2020-07-31

## 2020-01-13 ENCOUNTER — HOSPITAL ENCOUNTER (OUTPATIENT)
Dept: MRI IMAGING | Facility: HOSPITAL | Age: 60
Discharge: HOME | End: 2020-01-13
Payer: COMMERCIAL

## 2020-01-13 DIAGNOSIS — R51.9 NONINTRACTABLE HEADACHE, UNSPECIFIED CHRONICITY PATTERN, UNSPECIFIED HEADACHE TYPE: ICD-10-CM

## 2020-01-13 DIAGNOSIS — R42 DIZZINESS: ICD-10-CM

## 2020-01-13 PROCEDURE — 70553 MRI BRAIN STEM W/O & W/DYE: CPT

## 2020-01-13 PROCEDURE — A9585 GADOBUTROL INJECTION: HCPCS | Performed by: FAMILY MEDICINE

## 2020-01-13 RX ADMIN — GADOBUTROL 9 ML: 604.72 INJECTION INTRAVENOUS at 10:32

## 2020-01-15 ENCOUNTER — TELEPHONE (OUTPATIENT)
Dept: FAMILY MEDICINE CLINIC | Facility: CLINIC | Age: 60
End: 2020-01-15

## 2020-01-15 DIAGNOSIS — R42 DIZZINESS: Primary | ICD-10-CM

## 2020-01-15 NOTE — TELEPHONE ENCOUNTER
[facility] was [facility] to [facility] at [address] [place] [address]  She needs a letter stating that it is medically necessary for her to be seen there so she can get reimbursed by the insurance      ZR#[phone] ZOIE Jaime

## 2020-01-15 NOTE — TELEPHONE ENCOUNTER
I called and informed pt of your recommendations pt was given the number to call and schedule an appointment with Dr Wesley  informed a referral was placed

## 2020-01-15 NOTE — TELEPHONE ENCOUNTER
Glenn Ortega pt was informed that her MRI of her brain is normal  Pt stated that is good and bad  Should she come in to see you for a follow up to discuss seeing ENT? Please advise         Pt's number is [phone]

## 2020-02-04 ENCOUNTER — OFFICE VISIT (OUTPATIENT)
Dept: FAMILY MEDICINE CLINIC | Facility: CLINIC | Age: 60
End: 2020-02-04
Payer: COMMERCIAL

## 2020-02-04 VITALS
OXYGEN SATURATION: 98 % | HEIGHT: 69 IN | TEMPERATURE: 98.2 F | SYSTOLIC BLOOD PRESSURE: 154 MMHG | BODY MASS INDEX: 32.04 KG/M2 | HEART RATE: 78 BPM | WEIGHT: 216.3 LBS | DIASTOLIC BLOOD PRESSURE: 96 MMHG

## 2020-02-04 DIAGNOSIS — Z13.220 SCREENING FOR HYPERLIPIDEMIA: ICD-10-CM

## 2020-02-04 DIAGNOSIS — I10 HYPERTENSION, UNSPECIFIED TYPE: Primary | ICD-10-CM

## 2020-02-04 DIAGNOSIS — E55.9 VITAMIN D DEFICIENCY: ICD-10-CM

## 2020-02-04 DIAGNOSIS — E66.9 OBESITY, CLASS I, BMI 30-34.9: ICD-10-CM

## 2020-02-04 DIAGNOSIS — R42 DIZZINESS: ICD-10-CM

## 2020-02-04 PROCEDURE — 3080F DIAST BP >= 90 MM HG: CPT | Performed by: FAMILY MEDICINE

## 2020-02-04 PROCEDURE — 3008F BODY MASS INDEX DOCD: CPT | Performed by: FAMILY MEDICINE

## 2020-02-04 PROCEDURE — 99214 OFFICE O/P EST MOD 30 MIN: CPT | Performed by: FAMILY MEDICINE

## 2020-02-04 PROCEDURE — 1036F TOBACCO NON-USER: CPT | Performed by: FAMILY MEDICINE

## 2020-02-04 PROCEDURE — 3077F SYST BP >= 140 MM HG: CPT | Performed by: FAMILY MEDICINE

## 2020-02-04 NOTE — PATIENT INSTRUCTIONS
Here for HTN and dizziness and obesity  Continue BP med and lose weight as directed and also ENT for dizziness  She may need PT for dizziness if not better   Call if any problems and recheck in 3 months with labs for PE

## 2020-02-04 NOTE — PROGRESS NOTES
Assessment/Plan:  Chief Complaint   Patient presents with    Follow-up     Here for 3 month follow up  Wants to discuss vertigo and treatment plan with ENT  Patient Instructions   Here for HTN and dizziness and obesity  Continue BP med and lose weight as directed and also ENT for dizziness  She may need PT for dizziness if not better  Call if any problems and recheck in 3 months with labs for PE  No problem-specific Assessment & Plan notes found for this encounter  Diagnoses and all orders for this visit:    Hypertension, unspecified type  -     Comprehensive metabolic panel; Future    Obesity, Class I, BMI 30-34 9  -     Comprehensive metabolic panel; Future  -     Lipid Panel with Direct LDL reflex; Future    Dizziness  -     CBC and differential; Future    Vitamin D deficiency  -     Vitamin D 25 hydroxy; Future    Screening for hyperlipidemia  -     Comprehensive metabolic panel; Future  -     Lipid Panel with Direct LDL reflex; Future          Subjective:      Patient ID: Mathew Bravo is a 64 y o  female  Follow-up (Here for 3 month follow up  Wants to discuss vertigo and treatment plan with ENT ) here for BP check  The following portions of the patient's history were reviewed and updated as appropriate: allergies, current medications, past family history, past medical history, past social history, past surgical history and problem list     Review of Systems   Constitutional:        Obesity   HENT: Negative  Eyes: Negative  Respiratory: Negative  Cardiovascular: Negative  Gastrointestinal: Negative  Endocrine: Negative  Genitourinary: Negative  Musculoskeletal: Negative  Skin: Negative  Allergic/Immunologic: Negative  Neurological: Positive for dizziness  Hematological: Negative  Psychiatric/Behavioral: Negative            Objective:      /96   Pulse 78   Temp 98 2 °F (36 8 °C) (Temporal)   Ht 5' 9" (1 753 m)   Wt 98 1 kg (216 lb 4 8 oz) SpO2 98%   BMI 31 94 kg/m²          Physical Exam   Constitutional: She is oriented to person, place, and time  She appears well-developed and well-nourished  obesity   HENT:   Head: Normocephalic and atraumatic  Right Ear: External ear normal    Left Ear: External ear normal    Nose: Nose normal    Mouth/Throat: Oropharynx is clear and moist    Eyes: Pupils are equal, round, and reactive to light  Conjunctivae and EOM are normal    Neck: Normal range of motion  Neck supple  Cardiovascular: Normal rate, regular rhythm, normal heart sounds and intact distal pulses  Pulmonary/Chest: Effort normal and breath sounds normal    Musculoskeletal: Normal range of motion  Neurological: She is alert and oriented to person, place, and time  She has normal reflexes  Skin: Skin is warm and dry  Psychiatric: She has a normal mood and affect   Her behavior is normal

## 2020-02-24 ENCOUNTER — EVALUATION (OUTPATIENT)
Dept: PHYSICAL THERAPY | Facility: REHABILITATION | Age: 60
End: 2020-02-24
Payer: COMMERCIAL

## 2020-02-24 DIAGNOSIS — R26.81 UNSTEADY GAIT: ICD-10-CM

## 2020-02-24 DIAGNOSIS — H81.12 BENIGN PAROXYSMAL VERTIGO OF LEFT EAR: Primary | ICD-10-CM

## 2020-02-24 PROCEDURE — 97112 NEUROMUSCULAR REEDUCATION: CPT

## 2020-02-24 PROCEDURE — 97161 PT EVAL LOW COMPLEX 20 MIN: CPT

## 2020-02-24 NOTE — PROGRESS NOTES
INITIAL EVALUATION  Date: 2020  Name Gina Dumont  : 1960  MRN: [id number]  JVRK:[phone] (home)   Mobile: There is no such number on file (mobile)  Insurance Information: Payor: BLUE CROSS / Plan: [employer] BC PLAN 361 / Product Type: Blue Fee for Service /   Referring Provider: DO Kirby Spaulding    HPI: Gina Dumont is a 64 y o  female referred to outpatient physical therapy for   1  Unsteady gait    2  Benign paroxysmal vertigo of left ear      Patient reports has been experience spinning sensation since 2019  She initially thought it was her allergy as was taking over the counter medication  She went to see her family doctor  MRI: negative and follow up with Dr Salma Palmer, ENT  Client was referred to audiology, and cleared for hearing  She performed Epley with initially improved her symptoms  She returned due to increased symptoms and was able to perform maneuver with some improvement  At this time patient reports cannot look down and lift head up without increased symptoms  Falls Hx: none  Home Environment;  Lives with  two story home with child  Steps to enter  6    Patient Goal: I want to get back in the gym    Vertigo: Yese  Rocking or Swaying: No         Diplopia: No  Motion sickness: No  Floating, Swimming, Disconnected: No    Exacerbation Factors:  Bending over: Yes  Turning Head: Yes  Rolling in bed: Yes  Walking: Yes  Looking up: Yes  Supine to/from sitting: Yes  Walking in busy environment: No     Concurrent Complaints:  Tinnitus:No  Aural Fullness:No  Known hearing loss:No  Nausea, Vomiting: No  Altered Vision: No  Poor Concentration: No  Memory Loss: No  Peripheral Neuropathy:No    Objective    Resting Nystagmus: no  Gaze holding nystagmus No   Smooth Pursuit: normal  Vertical Saccades: normal  Horizontal Saccades: normal  Convergence:  7 cm averge  Cover/Uncover Test: normal  DVA: positive     - Static Head: 20/16 with glasses     - Dynamic Head:20/80 with glasses  R [place]-Hallpike: negative  L [place]-Hallpike: negative  R Roll Test:  negative  L Roll Test:  positive  R Head Thrust: negative  L Head Thrust: negative    OUTCOME MEASURES:  MCSTIB:    - EO firm:       49     - EC firm:    1 14     - EO foam:  1 13    - EC foam:  2 92    Functional Gait Assessment  3Gait level surface  2Change in gait speed  3Gait with horizontal head turns  2Gait with vertical head turns  3Gait and pivot turn  3Step over obstacle  2Gait with narrow base of support  2Gait with eyes closed  3Ambulating backwards  2Steps  25/30Total score        Assessment/Plan      Patient presents to outpatient physical therapy with sx consistent with Left benign paroxysmal vertigo with  horizontal canal imvolvement with 3 beats geotropic lasting 4 seconds, mild decreased balance and fear of returning to the gym due to increased symptoms with bending over, turning hear, looking up and  turning in bed  Patient will benefit from skilled outpatient physical therapy to address the above impairments and develop a HEP in order to improve patient's QOL and return to her recreational activities  STG's:     - Patient improves EC on foam task of MCSTIB for improved static balance within 2 weeks    - Patient is independent with initial home exercise program for improvements at home within 2weeks  LTG's:   - Patient improves on all tasks of the MCSTIB for improved static balance within 4 weeks   - Patient improves score on FGA  By 5 points for improved dynamic balance within 4 weeks   - Patient will return to gym activities without exacerbation of symptoms and risk of falls  Plan:  Patient will benefit from skilled outpatient physical therapy 8 in  4 wk  Therapeutic exercises to includes treatmil ambulation, Neuromuscular re-education to include recanilization and balance activities      Exercise Diary Balance  2/24/20 Initial HEP        Static Balance  Components of mCTSIB       Dynamic balance   Eyes  Closed walking    backwards walking    tandem walking    walking with head turns      Walking over objects    TUG           Ambulation          Treadmill         Recanalization  BBQ Roll x 2  Trial1: Decreased symptoms to 3/10   Trial 2: decreased symptoms 1-2/10

## 2020-02-28 ENCOUNTER — APPOINTMENT (OUTPATIENT)
Dept: PHYSICAL THERAPY | Facility: REHABILITATION | Age: 60
End: 2020-02-28
Payer: COMMERCIAL

## 2020-03-03 ENCOUNTER — OFFICE VISIT (OUTPATIENT)
Dept: PHYSICAL THERAPY | Facility: REHABILITATION | Age: 60
End: 2020-03-03
Payer: COMMERCIAL

## 2020-03-03 DIAGNOSIS — R26.81 UNSTEADY GAIT: ICD-10-CM

## 2020-03-03 DIAGNOSIS — H81.12 BENIGN PAROXYSMAL VERTIGO OF LEFT EAR: Primary | ICD-10-CM

## 2020-03-03 PROCEDURE — 97112 NEUROMUSCULAR REEDUCATION: CPT

## 2020-03-03 NOTE — PROGRESS NOTES
Daily Note     Today's date: 3/3/2020  Patient name: Becki Valencia  : 1960  MRN: [id number]  Referring provider: Kyle Shaikh DO  Dx:   Encounter Diagnosis     ICD-10-CM    1  Benign paroxysmal vertigo of left ear H81 12    2  Unsteady gait R26 81        Start Time: 1637  Stop Time: 1715  Total time in clinic (min): 38 minutes    Subjective: Patient reports she feels she's not dizzy currently, but that she feels dizzy when she gets up from laying down  She would like to be able to go back to the gym and do exercises in supine  Objective: See treatment diary below    Exercise Diary Balance  20  3/3     Initial HEP         Static Balance  Components of mCTSIB  Firm:  FTEC x1 min (minimum sway)  Tandem EO x1 minute each  Tandem EC x1 minute each  Foam:  FTEO x1 minute  FTEC x1 minute  Semitandem EO x1 minute each    Static HEP demonstration and education     Dynamic balance   Eyes  Closed walking     backwards walking     tandem walking     walking with head turns        Walking over objects     TUG        Eyes closed walking  20ft x6     backwards walking  20ft x4     tandem walking  20ft x2     walking with head turns   20ft x4    Backwards walking 20ft x4          Ambulation          Treadmill         Recanalization  BBQ Roll x 2  Trial1: Decreased symptoms to 3/10   Trial 2: decreased symptoms 1-2/10               Assessment: Tolerated treatment well  When walking EC pt ambulates at slower speed w/ wide CHARLEY, low guard of arms  Based on pt balance responses, observed LOB which suggests she is visually dependent for her balance  Educated pt HEP, pt returned safe demonstration  Patient exhibited good technique with therapeutic exercises and would benefit from continued PT      Plan: Continue per plan of care  Follow up with Elissa Espinoza PT concerning need for recanalization  Review HEP if needed

## 2020-03-05 ENCOUNTER — OFFICE VISIT (OUTPATIENT)
Dept: PHYSICAL THERAPY | Facility: REHABILITATION | Age: 60
End: 2020-03-05
Payer: COMMERCIAL

## 2020-03-05 DIAGNOSIS — R26.81 UNSTEADY GAIT: ICD-10-CM

## 2020-03-05 DIAGNOSIS — H81.12 BENIGN PAROXYSMAL VERTIGO OF LEFT EAR: Primary | ICD-10-CM

## 2020-03-05 PROCEDURE — 97112 NEUROMUSCULAR REEDUCATION: CPT

## 2020-03-05 NOTE — PROGRESS NOTES
Daily Note     Today's date: 3/3/2020  Patient name: Jessica Herrmann  : 1960  MRN: [id number]  Referring provider: Keara Bo DO  Dx:   Encounter Diagnosis     ICD-10-CM    1  Benign paroxysmal vertigo of left ear H81 12    2  Unsteady gait R26 81        Start Time: 1750  Stop Time: 1830  Total time in clinic (min): 40 minutes    Patient treated from [facility], PT from [id number]  Subjective: Patient reports she feels she's not dizzy currently, but that she feels dizzy when she gets up from laying down  She would like to be able to go back to the gym and do exercises in supine  Objective: See treatment diary below    Exercise Diary Balance  2/24/20  3/3  3/5   Initial HEP         Static Balance  Components of mCTSIB  Firm:  FTEC x1 min (minimum sway)  Tandem EO x1 minute each  Tandem EC x1 minute each  Foam:  FTEO x1 minute  FTEC x1 minute  Semitandem EO x1 minute each    Static HEP demonstration and education  Foam:  FTEC 1 min x4 (min sway)  Semitandem EO x2 min each    Supine to seated to standing on foam x8         Dynamic balance   Eyes  Closed walking     backwards walking     tandem walking     walking with head turns        Walking over objects     TUG        Eyes closed walking  20ft x6     backwards walking  20ft x4     tandem walking  20ft x2     walking with head turns   20ft x4    Backwards walking 20ft x4       Tandem walking on foam balance beam x2 minutes   Ambulation          Treadmill         Recanalization  BBQ Roll x 2  Trial1: Decreased symptoms to 310   Trial 2: decreased symptoms 1-2/10    PT reassessed patient, BBQ roll and georgette swanson pike    Orthostatic measurements  Supine:  133/77, 79 bpm  Seated: 125/87, 83 bpm  Standin/84, 101 bpm           Assessment: Tolerated treatment well  Pt completed static balance exercises w/ shoes off due to slight heel on shoes, minimum sway w/ eyes closed, moderate sway in semitandem   Pt denied c/o of symptoms w/ repeated supine to standing  Per verbal report from [facility], PT, patient was negative symptoms for BBQ roll and georgette swanson pike, no nystagmus also was observed by PT  Patient exhibited good technique with therapeutic exercises and would benefit from continued PT      Plan: Continue per plan of care  Continue repeated supine to standing, progress balance exercises on foam and with eyes closed

## 2020-03-11 ENCOUNTER — APPOINTMENT (OUTPATIENT)
Dept: PHYSICAL THERAPY | Facility: REHABILITATION | Age: 60
End: 2020-03-11
Payer: COMMERCIAL

## 2020-03-18 ENCOUNTER — OFFICE VISIT (OUTPATIENT)
Dept: PHYSICAL THERAPY | Facility: REHABILITATION | Age: 60
End: 2020-03-18
Payer: COMMERCIAL

## 2020-03-18 DIAGNOSIS — R26.81 UNSTEADY GAIT: ICD-10-CM

## 2020-03-18 DIAGNOSIS — H81.12 BENIGN PAROXYSMAL VERTIGO OF LEFT EAR: Primary | ICD-10-CM

## 2020-03-18 PROCEDURE — 97112 NEUROMUSCULAR REEDUCATION: CPT

## 2020-03-18 PROCEDURE — 97110 THERAPEUTIC EXERCISES: CPT

## 2020-03-18 NOTE — PROGRESS NOTES
Daily Note     Today's date: 3/3/2020  Patient name: Gil Rivera  : 1960  MRN: [id number]  Referring provider: Julius Davies DO  Dx:   Encounter Diagnosis     ICD-10-CM    1  Benign paroxysmal vertigo of left ear H81 12    2  Unsteady gait R26 81              Subjective: Pt reports she is still dizzy (head spinning) when she lays down and rolls  Objective: See treatment diary below    Exercise Diary Balance  2/24/20  3/3  3/5 3/18   Initial HEP          Static Balance  Components of mCTSIB  Firm:  FTEC x1 min (minimum sway)  Tandem EO x1 minute each  Tandem EC x1 minute each  Foam:  FTEO x1 minute  FTEC x1 minute  Semitandem EO x1 minute each    Static HEP demonstration and education  Foam:  FTEC 1 min x4 (min sway)  Semitandem EO x2 min each    Supine to seated to standing on foam x8       Foam:  FTEC 1 min x2 (no sway)  Tandem EO x2 min each (moderate sway)  Foam cone toe taps x10  Foam double cone toe taps x10    Sit to stands EC x10  Sit to stands EC on foam x5   Dynamic balance   Eyes  Closed walking     backwards walking     tandem walking     walking with head turns        Walking over objects     TUG        Eyes closed walking  20ft x6     backwards walking  20ft x4     tandem walking  20ft x2     walking with head turns   20ft x4    Backwards walking 20ft x4       Tandem walking on foam balance beam x2 minutes Tandem walking on foam balance beam x2 minutes    Habituation:  Supine>roll>stand 5x2   (5/10 head spinning, 0/10 after 1 minute rest)    Fwd walking EC 20ft x4  Backwards walking EC 20ft x2   Ambulation           Treadmill       x10 minutes, 3 mph   Recanalization  BBQ Roll x 2  Trial1: Decreased symptoms to 3/10   Trial 2: decreased symptoms 1-2/10    PT reassessed patient, BBQ roll and georgette swanson pike    Orthostatic measurements  Supine:  133/77, 79 bpm  Seated: 125/87, 83 bpm  Standin/84, 101 bpm            Assessment: Tolerated treatment well   Habituation was effective w/ patient, initially pt reported moderate level of head spinning, however w/ education for purpose of habituation and completing 2 sets of supine to standing, pt denied any increase of symptoms during and after second set of habitation exercise  Balance improvement noted on foam as well, minimal sway osbserved initially, but as exercise continued she was able to complete foam EC w/out any observable sway  Pt is scheduled for re evaluation next week  Plan: Continue per plan of care  Pt is nearing end of authorization date range and will require an extension if further dates are required

## 2020-03-26 ENCOUNTER — APPOINTMENT (OUTPATIENT)
Dept: PHYSICAL THERAPY | Facility: REHABILITATION | Age: 60
End: 2020-03-26
Payer: COMMERCIAL

## 2020-04-15 ENCOUNTER — TELEPHONE (OUTPATIENT)
Dept: PHYSICAL THERAPY | Facility: REHABILITATION | Age: 60
End: 2020-04-15

## 2020-04-21 ENCOUNTER — TELEMEDICINE (OUTPATIENT)
Dept: FAMILY MEDICINE CLINIC | Facility: CLINIC | Age: 60
End: 2020-04-21
Payer: COMMERCIAL

## 2020-04-21 DIAGNOSIS — E66.9 OBESITY, CLASS I, BMI 30-34.9: ICD-10-CM

## 2020-04-21 DIAGNOSIS — I10 HYPERTENSION, UNSPECIFIED TYPE: Primary | ICD-10-CM

## 2020-04-21 DIAGNOSIS — G47.00 INSOMNIA, UNSPECIFIED TYPE: ICD-10-CM

## 2020-04-21 DIAGNOSIS — G47.30 SLEEP APNEA, UNSPECIFIED TYPE: ICD-10-CM

## 2020-04-21 DIAGNOSIS — F41.9 ANXIETY: ICD-10-CM

## 2020-04-21 PROCEDURE — 99214 OFFICE O/P EST MOD 30 MIN: CPT | Performed by: FAMILY MEDICINE

## 2020-04-21 RX ORDER — ALPRAZOLAM 0.25 MG/1
0.25 TABLET ORAL
Qty: 10 TABLET | Refills: 0 | Status: SHIPPED | OUTPATIENT
Start: 2020-04-21 | End: 2020-04-30

## 2020-04-30 DIAGNOSIS — F41.9 ANXIETY: ICD-10-CM

## 2020-04-30 RX ORDER — ALPRAZOLAM 0.25 MG/1
TABLET ORAL
Qty: 10 TABLET | Refills: 0 | Status: SHIPPED | OUTPATIENT
Start: 2020-04-30 | End: 2020-06-16

## 2020-06-03 ENCOUNTER — EVALUATION (OUTPATIENT)
Dept: PHYSICAL THERAPY | Facility: REHABILITATION | Age: 60
End: 2020-06-03
Payer: COMMERCIAL

## 2020-06-03 ENCOUNTER — TRANSCRIBE ORDERS (OUTPATIENT)
Dept: PHYSICAL THERAPY | Facility: REHABILITATION | Age: 60
End: 2020-06-03

## 2020-06-03 DIAGNOSIS — R42 VERTIGO: Primary | ICD-10-CM

## 2020-06-03 PROCEDURE — 97112 NEUROMUSCULAR REEDUCATION: CPT | Performed by: PHYSICAL THERAPIST

## 2020-06-10 ENCOUNTER — OFFICE VISIT (OUTPATIENT)
Dept: PHYSICAL THERAPY | Facility: REHABILITATION | Age: 60
End: 2020-06-10
Payer: COMMERCIAL

## 2020-06-10 DIAGNOSIS — R42 VERTIGO: Primary | ICD-10-CM

## 2020-06-10 PROCEDURE — 97112 NEUROMUSCULAR REEDUCATION: CPT | Performed by: PHYSICAL THERAPIST

## 2020-06-16 DIAGNOSIS — F41.9 ANXIETY: ICD-10-CM

## 2020-06-16 RX ORDER — ALPRAZOLAM 0.25 MG/1
TABLET ORAL
Qty: 10 TABLET | Refills: 0 | Status: SHIPPED | OUTPATIENT
Start: 2020-06-16 | End: 2020-09-17

## 2020-06-17 ENCOUNTER — OFFICE VISIT (OUTPATIENT)
Dept: PHYSICAL THERAPY | Facility: REHABILITATION | Age: 60
End: 2020-06-17
Payer: COMMERCIAL

## 2020-06-17 DIAGNOSIS — R42 VERTIGO: Primary | ICD-10-CM

## 2020-06-17 PROCEDURE — 97112 NEUROMUSCULAR REEDUCATION: CPT | Performed by: PHYSICAL THERAPIST

## 2020-07-01 ENCOUNTER — OFFICE VISIT (OUTPATIENT)
Dept: PHYSICAL THERAPY | Facility: REHABILITATION | Age: 60
End: 2020-07-01
Payer: COMMERCIAL

## 2020-07-01 DIAGNOSIS — R42 VERTIGO: Primary | ICD-10-CM

## 2020-07-01 PROCEDURE — 97112 NEUROMUSCULAR REEDUCATION: CPT | Performed by: PHYSICAL THERAPIST

## 2020-07-01 NOTE — PROGRESS NOTES
Re-evaluation / Treatment Note     Today's date: 3/3/2020  Patient name: Elisabeth Augustin  : 1960  MRN: [id number]  Referring provider: Maria Del Carmen Bolden DO  Dx:   Encounter Diagnosis     ICD-10-CM    1  Benign paroxysmal vertigo of left ear H81 12    2  Unsteady gait R26 81      Subjective: Loretta reports she's doing really good  No symptoms when moving her head in bed  No issues with her prior gym routine  No activity limitations  Some days a little wonky, usually when she doesn't sleep well, moves a little slower on those days  Objective: See treatment diary below    Exercise Diary Balance 3/18 2020 6/10/2020 2020 2020   Initial HEP        Static Balance Foam:  FTEC 1 min x2 (no sway)  Tandem EO x2 min each (moderate sway)  Foam cone toe taps x10  Foam double cone toe taps x10    Sit to stands EC x10  Sit to stands EC on foam x5  /102, 84 bpm       Dynamic balance  Tandem walking on foam balance beam x2 minutes    Habituation:  Supine>roll>stand 5x2   (5/10 head spinning, 0/10 after 1 minute rest)    Fwd walking EC 20ft x4  Backwards walking EC 20ft x2       Ambulation         Treadmill x10 minutes, 3 mph       Neuromuscular re-education  Education about likely pathology, importance of return to prior movements, mild symptoms are fine, return gradually to prior exercise routine     Completed left sided BBQ roll, 30 sec in each position, sitting upright 1 minute upon completion Completed left sided modified Epley maneuver x 1, no symptoms in initial position    Repeated with no initial nystagmus, but 10/10 vertigo for about 10 seconds, waited 2 min in sitting    Completed a 3rd time with 4/10 symptoms    Completed a 4th time with 2/10 symptoms     Completed left sided modified Epley maneuver, x 2, no nystagmus observed, moderate symptoms 1-2 seconds then completely tobi with [place]-Hallpike (used video infrared goggles)    Standing VOR x 1, horizontal, 30 sec x 4    Modified burpee, using plinth, x 4 0/10 symptoms  Modified burpee to floor, x 4, 0/10 symptoms      Completed [place]-Hallpike with infrared goggles, no resting nystagmus, no nystagmus nor symptoms with [place]-Hallpike  Assessment:  No positional vertigo is present at this point  Gwyn Mejia has returned to all prior activities and significantly improved in her Dizziness Handicap Inventory Score, not even reaching the "mild" stage of dizziness  We reviewed the likely causes of prior symptoms; she has a home exercise version of the Epley's maneuver, and has a good understanding of habituation should the motion sensitivity recur  She will be discharged from physical therapy  STG's:     - Patient improves EC on foam task of MCSTIB for improved static balance within 2 weeks  NOT ASSESSED    - Patient is independent with initial home exercise program for improvements at home within 2weeks  MET  LTG's:   - Patient improves on all tasks of the MCSTIB for improved static balance within 4 weeks  NOT ASSESSED   - Patient improves score on FGA  By 5 points for improved dynamic balance within 4 weeks  NOT ASSESSED   - Patient will return to gym activities without exacerbation of symptoms and risk of falls  New goal: Loretta scores as at most "mild" with Dizziness Handicap Inventory  MET  New goal: Gwyn Mejia returns to all prior activities  MET    Plan:   Discharge from physical therapy

## 2020-07-01 NOTE — LETTER
July 3, 2020    Luis Eduardo Sepulveda DO  [address]  [address]  [facility] [address]    Patient: Chiara Aguirre   YOB: 1960   Date of Visit: 2020     Encounter Diagnosis     ICD-10-CM    1  Vertigo R42        Dear Dr Olivia Damon:    Thank you for your recent referral of Chiara Aguirre  Please review the attached evaluation summary from Loretta's recent visit  Please verify that you agree with the plan of care by signing the attached order  If you have any questions or concerns, please do not hesitate to call  I sincerely appreciate the opportunity to share in the care of one of your patients and hope to have another opportunity to work with you in the near future  Sincerely,    Surya Polo, PT      Referring Provider:      I certify that I have read the below Plan of Care and certify the need for these services furnished under this plan of treatment while under my care  Luis Eduardo Sepulveda DO  [address]  [address]: [phone]          Re-evaluation / Treatment Note     Today's date: 3/3/2020  Patient name: Chiara Aguirre  : 1960  MRN: [id number]  Referring provider: Ori Stearns DO  Dx:   Encounter Diagnosis     ICD-10-CM    1  Benign paroxysmal vertigo of left ear H81 12    2  Unsteady gait R26 81      Subjective: Loretta reports she's doing really good  No symptoms when moving her head in bed  No issues with her prior gym routine  No activity limitations  Some days a little wonky, usually when she doesn't sleep well, moves a little slower on those days        Objective: See treatment diary below    Exercise Diary Balance 3/18 2020 6/10/2020 2020 2020   Initial HEP        Static Balance Foam:  FTEC 1 min x2 (no sway)  Tandem EO x2 min each (moderate sway)  Foam cone toe taps x10  Foam double cone toe taps x10    Sit to stands EC x10  Sit to stands EC on foam x5  /102, 84 bpm Dynamic balance  Tandem walking on foam balance beam x2 minutes    Habituation:  Supine>roll>stand 5x2   (5/10 head spinning, 0/10 after 1 minute rest)    Fwd walking EC 20ft x4  Backwards walking EC 20ft x2       Ambulation         Treadmill x10 minutes, 3 mph       Neuromuscular re-education  Education about likely pathology, importance of return to prior movements, mild symptoms are fine, return gradually to prior exercise routine     Completed left sided BBQ roll, 30 sec in each position, sitting upright 1 minute upon completion Completed left sided modified Epley maneuver x 1, no symptoms in initial position    Repeated with no initial nystagmus, but 10/10 vertigo for about 10 seconds, waited 2 min in sitting    Completed a 3rd time with 4/10 symptoms    Completed a 4th time with 2/10 symptoms  Completed left sided modified Epley maneuver, x 2, no nystagmus observed, moderate symptoms 1-2 seconds then completely tobi with [place]-Hallpike (used video infrared goggles)    Standing VOR x 1, horizontal, 30 sec x 4    Modified burpee, using plinth, x 4 0/10 symptoms  Modified burpee to floor, x 4, 0/10 symptoms      Completed [place]-Hallpike with infrared goggles, no resting nystagmus, no nystagmus nor symptoms with [place]-Hallpike  Assessment:  No positional vertigo is present at this point  Maggie Nur has returned to all prior activities and significantly improved in her Dizziness Handicap Inventory Score, not even reaching the "mild" stage of dizziness  We reviewed the likely causes of prior symptoms; she has a home exercise version of the Epley's maneuver, and has a good understanding of habituation should the motion sensitivity recur  She will be discharged from physical therapy        STG's:     - Patient improves EC on foam task of MCSTIB for improved static balance within 2 weeks  NOT ASSESSED    - Patient is independent with initial home exercise program for improvements at home within 2weeks  MET  LTG's: - Patient improves on all tasks of the MCSTIB for improved static balance within 4 weeks  NOT ASSESSED   - Patient improves score on FGA  By 5 points for improved dynamic balance within 4 weeks  NOT ASSESSED   - Patient will return to gym activities without exacerbation of symptoms and risk of falls  New goal: Loretta scores as at most "mild" with Dizziness Handicap Inventory  MET  New goal: Candie Ingram returns to all prior activities  MET    Plan:   Discharge from physical therapy

## 2020-07-02 ENCOUNTER — TRANSCRIBE ORDERS (OUTPATIENT)
Dept: PHYSICAL THERAPY | Facility: REHABILITATION | Age: 60
End: 2020-07-02

## 2020-07-02 DIAGNOSIS — R42 DIZZINESS AND GIDDINESS: Primary | ICD-10-CM

## 2020-07-03 ENCOUNTER — TRANSCRIBE ORDERS (OUTPATIENT)
Dept: PHYSICAL THERAPY | Facility: REHABILITATION | Age: 60
End: 2020-07-03

## 2020-07-03 DIAGNOSIS — R42 DIZZINESS AND GIDDINESS: Primary | ICD-10-CM

## 2020-07-31 DIAGNOSIS — I10 ESSENTIAL HYPERTENSION: ICD-10-CM

## 2020-07-31 RX ORDER — CANDESARTAN 16 MG/1
TABLET ORAL
Qty: 30 TABLET | Refills: 5 | Status: SHIPPED | OUTPATIENT
Start: 2020-07-31 | End: 2021-01-22

## 2020-08-20 ENCOUNTER — TELEPHONE (OUTPATIENT)
Dept: FAMILY MEDICINE CLINIC | Facility: CLINIC | Age: 60
End: 2020-08-20

## 2020-08-20 DIAGNOSIS — E83.52 SERUM CALCIUM ELEVATED: Primary | ICD-10-CM

## 2020-08-21 ENCOUNTER — LAB (OUTPATIENT)
Dept: LAB | Facility: CLINIC | Age: 60
End: 2020-08-21
Payer: COMMERCIAL

## 2020-08-21 ENCOUNTER — TELEPHONE (OUTPATIENT)
Dept: ENDOCRINOLOGY | Facility: CLINIC | Age: 60
End: 2020-08-21

## 2020-08-21 DIAGNOSIS — E66.9 OBESITY, CLASS I, BMI 30-34.9: ICD-10-CM

## 2020-08-21 DIAGNOSIS — E55.9 VITAMIN D DEFICIENCY: ICD-10-CM

## 2020-08-21 DIAGNOSIS — Z13.220 SCREENING FOR HYPERLIPIDEMIA: ICD-10-CM

## 2020-08-21 DIAGNOSIS — R42 DIZZINESS: ICD-10-CM

## 2020-08-21 DIAGNOSIS — I10 HYPERTENSION, UNSPECIFIED TYPE: ICD-10-CM

## 2020-08-21 LAB
25(OH)D3 SERPL-MCNC: 24.2 NG/ML (ref 30–100)
ALBUMIN SERPL BCP-MCNC: 3.7 G/DL (ref 3.5–5)
ALP SERPL-CCNC: 67 U/L (ref 46–116)
ALT SERPL W P-5'-P-CCNC: 32 U/L (ref 12–78)
ANION GAP SERPL CALCULATED.3IONS-SCNC: 6 MMOL/L (ref 4–13)
AST SERPL W P-5'-P-CCNC: 14 U/L (ref 5–45)
BASOPHILS # BLD AUTO: 0.04 THOUSANDS/ΜL (ref 0–0.1)
BASOPHILS NFR BLD AUTO: 1 % (ref 0–1)
BILIRUB SERPL-MCNC: 0.45 MG/DL (ref 0.2–1)
BUN SERPL-MCNC: 12 MG/DL (ref 5–25)
CALCIUM SERPL-MCNC: 9.3 MG/DL (ref 8.3–10.1)
CHLORIDE SERPL-SCNC: 107 MMOL/L (ref 100–108)
CHOLEST SERPL-MCNC: 300 MG/DL (ref 50–200)
CO2 SERPL-SCNC: 28 MMOL/L (ref 21–32)
CREAT SERPL-MCNC: 0.93 MG/DL (ref 0.6–1.3)
EOSINOPHIL # BLD AUTO: 0.08 THOUSAND/ΜL (ref 0–0.61)
EOSINOPHIL NFR BLD AUTO: 2 % (ref 0–6)
ERYTHROCYTE [DISTWIDTH] IN BLOOD BY AUTOMATED COUNT: 12.8 % (ref 11.6–15.1)
GFR SERPL CREATININE-BSD FRML MDRD: 68 ML/MIN/1.73SQ M
GLUCOSE P FAST SERPL-MCNC: 104 MG/DL (ref 65–99)
HCT VFR BLD AUTO: 40 % (ref 34.8–46.1)
HDLC SERPL-MCNC: 67 MG/DL
HGB BLD-MCNC: 13.2 G/DL (ref 11.5–15.4)
IMM GRANULOCYTES # BLD AUTO: 0.01 THOUSAND/UL (ref 0–0.2)
IMM GRANULOCYTES NFR BLD AUTO: 0 % (ref 0–2)
LDLC SERPL CALC-MCNC: 219 MG/DL (ref 0–100)
LYMPHOCYTES # BLD AUTO: 1.81 THOUSANDS/ΜL (ref 0.6–4.47)
LYMPHOCYTES NFR BLD AUTO: 39 % (ref 14–44)
MCH RBC QN AUTO: 32 PG (ref 26.8–34.3)
MCHC RBC AUTO-ENTMCNC: 33 G/DL (ref 31.4–37.4)
MCV RBC AUTO: 97 FL (ref 82–98)
MONOCYTES # BLD AUTO: 0.37 THOUSAND/ΜL (ref 0.17–1.22)
MONOCYTES NFR BLD AUTO: 8 % (ref 4–12)
NEUTROPHILS # BLD AUTO: 2.35 THOUSANDS/ΜL (ref 1.85–7.62)
NEUTS SEG NFR BLD AUTO: 50 % (ref 43–75)
NRBC BLD AUTO-RTO: 0 /100 WBCS
PLATELET # BLD AUTO: 142 THOUSANDS/UL (ref 149–390)
PMV BLD AUTO: 13.6 FL (ref 8.9–12.7)
POTASSIUM SERPL-SCNC: 4.5 MMOL/L (ref 3.5–5.3)
PROT SERPL-MCNC: 8.2 G/DL (ref 6.4–8.2)
RBC # BLD AUTO: 4.13 MILLION/UL (ref 3.81–5.12)
SODIUM SERPL-SCNC: 141 MMOL/L (ref 136–145)
TRIGL SERPL-MCNC: 70 MG/DL
WBC # BLD AUTO: 4.66 THOUSAND/UL (ref 4.31–10.16)

## 2020-08-21 PROCEDURE — 80053 COMPREHEN METABOLIC PANEL: CPT

## 2020-08-21 PROCEDURE — 36415 COLL VENOUS BLD VENIPUNCTURE: CPT

## 2020-08-21 PROCEDURE — 80061 LIPID PANEL: CPT

## 2020-08-21 PROCEDURE — 85025 COMPLETE CBC W/AUTO DIFF WBC: CPT

## 2020-08-21 PROCEDURE — 82306 VITAMIN D 25 HYDROXY: CPT

## 2020-08-21 NOTE — TELEPHONE ENCOUNTER
Called and l/m for patient stating that per Dr Sabrina Marc patient can wait until her 9/17/20 appt to be seen

## 2020-08-21 NOTE — TELEPHONE ENCOUNTER
RE: nini review labs   Received: Today   Message Contents   MD Wilton Bansal               yes    Previous Messages     ----- Message -----   From: Wilton Perez   Sent: 8/21/2020   2:08 PM EDT   To: Dajuan Momin MD   Subject: FW: pleas review labs                             Please review labs and let us know if patient can wait until her appointment on 9/17/20 with you to be seen  Thanks!     ----- Message -----   From: Des Mensahdandy   Sent: 8/21/2020   1:46 PM EDT   To: , *   Subject: pleas review labs                                 Patient referred for Serum calcium elevated  She was not very happy with September appt  She think that this may be more urgent issue  I am not sure please review her labs and advise if she does need to be seen sooner       Thank you

## 2020-08-24 ENCOUNTER — OFFICE VISIT (OUTPATIENT)
Dept: FAMILY MEDICINE CLINIC | Facility: CLINIC | Age: 60
End: 2020-08-24
Payer: COMMERCIAL

## 2020-08-24 VITALS
OXYGEN SATURATION: 99 % | TEMPERATURE: 97.3 F | DIASTOLIC BLOOD PRESSURE: 84 MMHG | HEIGHT: 69 IN | BODY MASS INDEX: 31.93 KG/M2 | RESPIRATION RATE: 17 BRPM | SYSTOLIC BLOOD PRESSURE: 132 MMHG | HEART RATE: 83 BPM | WEIGHT: 215.6 LBS

## 2020-08-24 DIAGNOSIS — E78.5 HYPERLIPIDEMIA, UNSPECIFIED HYPERLIPIDEMIA TYPE: ICD-10-CM

## 2020-08-24 DIAGNOSIS — E55.9 VITAMIN D DEFICIENCY: ICD-10-CM

## 2020-08-24 DIAGNOSIS — E83.52 HYPERCALCEMIA: ICD-10-CM

## 2020-08-24 DIAGNOSIS — I10 ESSENTIAL HYPERTENSION: Primary | ICD-10-CM

## 2020-08-24 PROCEDURE — 3079F DIAST BP 80-89 MM HG: CPT | Performed by: FAMILY MEDICINE

## 2020-08-24 PROCEDURE — 99214 OFFICE O/P EST MOD 30 MIN: CPT | Performed by: FAMILY MEDICINE

## 2020-08-24 PROCEDURE — 3008F BODY MASS INDEX DOCD: CPT | Performed by: FAMILY MEDICINE

## 2020-08-24 PROCEDURE — 3075F SYST BP GE 130 - 139MM HG: CPT | Performed by: FAMILY MEDICINE

## 2020-08-24 PROCEDURE — 1036F TOBACCO NON-USER: CPT | Performed by: FAMILY MEDICINE

## 2020-08-24 NOTE — PATIENT INSTRUCTIONS
Here to review labs, Her calcium was elevated at 10 5 but normalized on last recent labs to 9 3  He LDL is elevated and will start a low cholesterol diet and lose weight to help  She prefers being on no cholesterol med and will give trial and recheck labs  Take Vitamin D3 as directed as it is low Vitamin D3 5000 IU daily

## 2020-08-24 NOTE — PROGRESS NOTES
BMI Counseling: Body mass index is 31 84 kg/m²  The BMI is above normal  Nutrition recommendations include reducing portion sizes, decreasing overall calorie intake, 3-5 servings of fruits/vegetables daily, reducing fast food intake, consuming healthier snacks and reducing intake of cholesterol  Exercise recommendations include exercising 3-5 times per week

## 2020-08-24 NOTE — PROGRESS NOTES
Assessment/Plan:No chief complaint on file  Patient Instructions   Here to review labs, Her calcium was elevated at 10 5 but normalized on last recent labs to 9 3  He LDL is elevated and will start a low cholesterol diet and lose weight to help  She prefers being on no cholesterol med and will give trial and recheck labs  Take Vitamin D3 as directed as it is low Vitamin D3 5000 IU daily  No problem-specific Assessment & Plan notes found for this encounter  Diagnoses and all orders for this visit:    Essential hypertension  -     Comprehensive metabolic panel; Future    Hyperlipidemia, unspecified hyperlipidemia type  -     Comprehensive metabolic panel; Future  -     Lipid Panel with Direct LDL reflex; Future    Vitamin D deficiency  -     Vitamin D 25 hydroxy; Future    Hypercalcemia  -     Comprehensive metabolic panel; Future          Subjective:      Patient ID: Zuleyma Brown is a 64 y o  female  Here to review labs  Here for elevated ldl and also hypercalcemia and recently normalized  She has an appt  To see Endo for this September 17, 2020  No cp or sob, or ha  Hx of HTN stable today and trying to lose weight, just bought a bicycle  The following portions of the patient's history were reviewed and updated as appropriate: allergies, current medications, past family history, past medical history, past social history, past surgical history and problem list     Review of Systems   Constitutional:        Obesity   HENT: Negative  Eyes: Negative  Respiratory: Negative  Cardiovascular: Negative  Gastrointestinal: Negative  Endocrine: Negative  Genitourinary: Negative  Musculoskeletal: Negative  Skin: Negative  Allergic/Immunologic: Negative  Neurological: Negative  Hematological: Negative  Psychiatric/Behavioral: Negative            Objective:      /84 (BP Location: Left arm, Patient Position: Sitting, Cuff Size: Large)   Pulse 83   Temp (!) 97 3 °F (36 3 °C) (Temporal)   Resp 17   Ht 5' 9" (1 753 m)   Wt 97 8 kg (215 lb 9 6 oz)   SpO2 99%   BMI 31 84 kg/m²          Physical Exam  Constitutional:       Appearance: She is well-developed  Comments: obesity   HENT:      Head: Normocephalic and atraumatic  Right Ear: External ear normal       Left Ear: External ear normal       Nose: Nose normal    Eyes:      Conjunctiva/sclera: Conjunctivae normal       Pupils: Pupils are equal, round, and reactive to light  Neck:      Musculoskeletal: Normal range of motion and neck supple  Cardiovascular:      Rate and Rhythm: Normal rate and regular rhythm  Heart sounds: Normal heart sounds  Pulmonary:      Effort: Pulmonary effort is normal       Breath sounds: Normal breath sounds  Musculoskeletal: Normal range of motion  Skin:     General: Skin is warm and dry  Neurological:      Mental Status: She is alert and oriented to person, place, and time  Deep Tendon Reflexes: Reflexes are normal and symmetric     Psychiatric:         Behavior: Behavior normal

## 2020-09-17 ENCOUNTER — CONSULT (OUTPATIENT)
Dept: ENDOCRINOLOGY | Facility: CLINIC | Age: 60
End: 2020-09-17
Payer: COMMERCIAL

## 2020-09-17 VITALS
TEMPERATURE: 97.1 F | WEIGHT: 219.2 LBS | HEIGHT: 69 IN | DIASTOLIC BLOOD PRESSURE: 80 MMHG | SYSTOLIC BLOOD PRESSURE: 126 MMHG | BODY MASS INDEX: 32.47 KG/M2 | HEART RATE: 93 BPM

## 2020-09-17 DIAGNOSIS — E55.9 VITAMIN D DEFICIENCY: ICD-10-CM

## 2020-09-17 DIAGNOSIS — R73.01 IMPAIRED FASTING GLUCOSE: ICD-10-CM

## 2020-09-17 DIAGNOSIS — E83.52 SERUM CALCIUM ELEVATED: Primary | ICD-10-CM

## 2020-09-17 PROCEDURE — 99244 OFF/OP CNSLTJ NEW/EST MOD 40: CPT | Performed by: INTERNAL MEDICINE

## 2020-09-17 PROCEDURE — 1036F TOBACCO NON-USER: CPT | Performed by: INTERNAL MEDICINE

## 2020-09-17 NOTE — ASSESSMENT & PLAN NOTE
Counseled on increased risk of developing type 2 diabetes  Focus on dietary and lifestyle modifications and weight loss    She will discuss with her primary care about following up with nutritionist

## 2020-09-17 NOTE — ASSESSMENT & PLAN NOTE
Calcium was elevated once-prior labs showed normal calcium  She has also had repeat labs since then in which the calcium was normal   Likely lab error  However will repeat calcium once vitamin-D has normalized and also check PTH    If lab testing is normal then she will follow up with her primary care

## 2020-09-17 NOTE — PROGRESS NOTES
Connie Coy 64 y o  female MRN: [id number]    Encounter: [id number]      Assessment/Plan     Problem List Items Addressed This Visit        Endocrine    Impaired fasting glucose     Counseled on increased risk of developing type 2 diabetes  Focus on dietary and lifestyle modifications and weight loss  She will discuss with her primary care about following up with nutritionist            Other    Serum calcium elevated - Primary     Calcium was elevated once-prior labs showed normal calcium  She has also had repeat labs since then in which the calcium was normal   Likely lab error  However will repeat calcium once vitamin-D has normalized and also check PTH  If lab testing is normal then she will follow up with her primary care         Relevant Orders    Phosphorus Lab Collect    PTH, intact Lab Collect Lab Collect    Vitamin D deficiency     Continue vitamin-D supplementations-she should have repeat vitamin-D 25 hydroxy in the next few months             CC:   Hypercalcemia    History of Present Illness     HPI:  68-year-old female referred here for  evaluation of hypercalcemia-she had initially presented to her primary care with complaints palpitations and anxiety in the past few months - under a lot of stress at work -during workup was found to have mild hypercalcemia, on repeat testing calcium was normal  She currently has been seeing a therapist and feeling better   She has history of vitamin-D deficiency and was not taking her supplementations regularly, started taking Vitamin D3 5000 iu daily for the past few weeks   No calcium supplementations     No history of kidney stones , no history of fragility farctures     No polyuria , polydipsia , no constipation , occasional arthralgias       Review of Systems   Constitutional: Negative for unexpected weight change  Cardiovascular: Negative for palpitations and leg swelling     Gastrointestinal: Negative for constipation, diarrhea, nausea and vomiting  Endocrine: Negative for polydipsia and polyuria  Musculoskeletal: Negative for arthralgias and myalgias  Skin: Negative for rash and wound  Neurological: Negative for tremors and weakness  Psychiatric/Behavioral: The patient is nervous/anxious  All other systems reviewed and are negative  Historical Information   Past Medical History:   Diagnosis Date    Hypertension     Sleep apnea      Past Surgical History:   Procedure Laterality Date    ADENOIDECTOMY      BUNIONECTOMY      NASAL SEPTUM SURGERY      RHINOPLASTY      TONSILLECTOMY       Social History   Social History     Substance and Sexual Activity   Alcohol Use Yes    Comment: socially     Social History     Substance and Sexual Activity   Drug Use No     Social History     Tobacco Use   Smoking Status Former Smoker    Last attempt to quit:     Years since quittin 7   Smokeless Tobacco Never Used     Family History:   Family History   Problem Relation Age of Onset    Diabetes Mother     Hypertension Mother     Lung cancer Father     Cancer Neg Hx     Heart disease Neg Hx     Thyroid disease Neg Hx     Stroke Neg Hx        Meds/Allergies   Current Outpatient Medications   Medication Sig Dispense Refill    candesartan (ATACAND) 16 mg tablet take 1 tablet by mouth once daily 30 tablet 5    Cholecalciferol 125 MCG (5000 UT) capsule Take 1 capsule by mouth       Omega-3 Fatty Acids (FISH OIL PO) Take 2 g by mouth      tretinoin (RETIN-A) 0 025 % cream APPLY TO FACE EVERY NIGHT AT BEDTIME TO TREAT AND PREVENT ACNE  3     No current facility-administered medications for this visit  Allergies   Allergen Reactions    Ampicillin Hives       Objective   Vitals: Blood pressure 126/80, pulse 93, temperature (!) 97 1 °F (36 2 °C), height 5' 9" (1 753 m), weight 99 4 kg (219 lb 3 2 oz)  Physical Exam  Vitals signs reviewed  Constitutional:       Appearance: Normal appearance  She is obese   She is not diaphoretic  HENT:      Head: Normocephalic and atraumatic  Eyes:      General: No scleral icterus  Extraocular Movements: Extraocular movements intact  Neck:      Musculoskeletal: Neck supple  Cardiovascular:      Rate and Rhythm: Normal rate and regular rhythm  Heart sounds: Normal heart sounds  No murmur  Pulmonary:      Effort: Pulmonary effort is normal  No respiratory distress  Breath sounds: Normal breath sounds  No wheezing or rales  Abdominal:      General: There is no distension  Palpations: Abdomen is soft  Tenderness: There is no abdominal tenderness  There is no guarding  Musculoskeletal:      Right lower leg: No edema  Left lower leg: No edema  Lymphadenopathy:      Cervical: No cervical adenopathy  Skin:     General: Skin is warm and dry  Neurological:      General: No focal deficit present  Mental Status: She is alert and oriented to person, place, and time  Psychiatric:         Mood and Affect: Mood normal          Behavior: Behavior normal          Thought Content: Thought content normal          Judgment: Judgment normal          The history was obtained from the review of the chart, patient  Lab Results:   Lab Results   Component Value Date/Time    Potassium 4 5 08/21/2020 09:04 AM    Chloride 107 08/21/2020 09:04 AM    CO2 28 08/21/2020 09:04 AM    BUN 12 08/21/2020 09:04 AM    Creatinine 0 93 08/21/2020 09:04 AM    Glucose, Fasting 104 (H) 08/21/2020 09:04 AM    Calcium 9 3 08/21/2020 09:04 AM    eGFR 68 08/21/2020 09:04 AM    Vit D, 25-Hydroxy 24 2 (L) 08/21/2020 09:04 AM         Imaging Studies:                        Portions of the record may have been created with voice recognition software  Occasional wrong word or "sound a like" substitutions may have occurred due to the inherent limitations of voice recognition software  Read the chart carefully and recognize, using context, where substitutions have occurred

## 2020-09-17 NOTE — ASSESSMENT & PLAN NOTE
Continue vitamin-D supplementations-she should have repeat vitamin-D 25 hydroxy in the next few months

## 2020-09-21 ENCOUNTER — TELEPHONE (OUTPATIENT)
Dept: FAMILY MEDICINE CLINIC | Facility: CLINIC | Age: 60
End: 2020-09-21

## 2020-10-23 ENCOUNTER — OFFICE VISIT (OUTPATIENT)
Dept: FAMILY MEDICINE CLINIC | Facility: CLINIC | Age: 60
End: 2020-10-23
Payer: COMMERCIAL

## 2020-10-23 VITALS
TEMPERATURE: 96.8 F | HEART RATE: 99 BPM | WEIGHT: 214.6 LBS | HEIGHT: 69 IN | SYSTOLIC BLOOD PRESSURE: 132 MMHG | DIASTOLIC BLOOD PRESSURE: 80 MMHG | RESPIRATION RATE: 17 BRPM | BODY MASS INDEX: 31.78 KG/M2 | OXYGEN SATURATION: 100 %

## 2020-10-23 DIAGNOSIS — E66.9 OBESITY (BMI 30-39.9): ICD-10-CM

## 2020-10-23 DIAGNOSIS — I10 ESSENTIAL HYPERTENSION: ICD-10-CM

## 2020-10-23 DIAGNOSIS — F41.9 ANXIETY: Primary | ICD-10-CM

## 2020-10-23 DIAGNOSIS — F32.A DEPRESSION, UNSPECIFIED DEPRESSION TYPE: ICD-10-CM

## 2020-10-23 PROCEDURE — 99214 OFFICE O/P EST MOD 30 MIN: CPT | Performed by: FAMILY MEDICINE

## 2020-10-23 RX ORDER — ESCITALOPRAM OXALATE 10 MG/1
10 TABLET ORAL DAILY
Qty: 30 TABLET | Refills: 5 | Status: SHIPPED | OUTPATIENT
Start: 2020-10-23 | End: 2021-04-21

## 2020-11-03 ENCOUNTER — OFFICE VISIT (OUTPATIENT)
Dept: ENDOCRINOLOGY | Facility: CLINIC | Age: 60
End: 2020-11-03
Payer: COMMERCIAL

## 2020-11-03 VITALS
WEIGHT: 219.2 LBS | DIASTOLIC BLOOD PRESSURE: 80 MMHG | TEMPERATURE: 98 F | SYSTOLIC BLOOD PRESSURE: 124 MMHG | HEART RATE: 54 BPM | BODY MASS INDEX: 32.47 KG/M2 | HEIGHT: 69 IN

## 2020-11-03 DIAGNOSIS — E55.9 VITAMIN D DEFICIENCY: ICD-10-CM

## 2020-11-03 DIAGNOSIS — E83.52 SERUM CALCIUM ELEVATED: Primary | ICD-10-CM

## 2020-11-03 PROCEDURE — 1036F TOBACCO NON-USER: CPT | Performed by: INTERNAL MEDICINE

## 2020-11-03 PROCEDURE — 3074F SYST BP LT 130 MM HG: CPT | Performed by: INTERNAL MEDICINE

## 2020-11-03 PROCEDURE — 3079F DIAST BP 80-89 MM HG: CPT | Performed by: INTERNAL MEDICINE

## 2020-11-03 PROCEDURE — 3008F BODY MASS INDEX DOCD: CPT | Performed by: INTERNAL MEDICINE

## 2020-11-03 PROCEDURE — 99214 OFFICE O/P EST MOD 30 MIN: CPT | Performed by: INTERNAL MEDICINE

## 2021-01-21 ENCOUNTER — APPOINTMENT (OUTPATIENT)
Dept: LAB | Facility: CLINIC | Age: 61
End: 2021-01-21
Payer: COMMERCIAL

## 2021-01-21 DIAGNOSIS — E78.5 HYPERLIPIDEMIA, UNSPECIFIED HYPERLIPIDEMIA TYPE: ICD-10-CM

## 2021-01-21 DIAGNOSIS — E55.9 VITAMIN D DEFICIENCY: ICD-10-CM

## 2021-01-21 DIAGNOSIS — E83.52 HYPERCALCEMIA: ICD-10-CM

## 2021-01-21 DIAGNOSIS — I10 ESSENTIAL HYPERTENSION: ICD-10-CM

## 2021-01-21 DIAGNOSIS — E83.52 SERUM CALCIUM ELEVATED: ICD-10-CM

## 2021-01-21 LAB
25(OH)D3 SERPL-MCNC: 41.5 NG/ML (ref 30–100)
ALBUMIN SERPL BCP-MCNC: 3.9 G/DL (ref 3.5–5)
ALP SERPL-CCNC: 68 U/L (ref 46–116)
ALT SERPL W P-5'-P-CCNC: 25 U/L (ref 12–78)
ANION GAP SERPL CALCULATED.3IONS-SCNC: 3 MMOL/L (ref 4–13)
AST SERPL W P-5'-P-CCNC: 13 U/L (ref 5–45)
BILIRUB SERPL-MCNC: 0.55 MG/DL (ref 0.2–1)
BUN SERPL-MCNC: 16 MG/DL (ref 5–25)
CALCIUM SERPL-MCNC: 9.9 MG/DL (ref 8.3–10.1)
CHLORIDE SERPL-SCNC: 105 MMOL/L (ref 100–108)
CHOLEST SERPL-MCNC: 278 MG/DL (ref 50–200)
CO2 SERPL-SCNC: 29 MMOL/L (ref 21–32)
CREAT SERPL-MCNC: 0.95 MG/DL (ref 0.6–1.3)
GFR SERPL CREATININE-BSD FRML MDRD: 66 ML/MIN/1.73SQ M
GLUCOSE P FAST SERPL-MCNC: 100 MG/DL (ref 65–99)
HDLC SERPL-MCNC: 59 MG/DL
LDLC SERPL CALC-MCNC: 199 MG/DL (ref 0–100)
PHOSPHATE SERPL-MCNC: 3.4 MG/DL (ref 2.7–4.5)
POTASSIUM SERPL-SCNC: 4.4 MMOL/L (ref 3.5–5.3)
PROT SERPL-MCNC: 8 G/DL (ref 6.4–8.2)
PTH-INTACT SERPL-MCNC: 36 PG/ML (ref 18.4–80.1)
SODIUM SERPL-SCNC: 137 MMOL/L (ref 136–145)
TRIGL SERPL-MCNC: 101 MG/DL

## 2021-01-21 PROCEDURE — 80061 LIPID PANEL: CPT

## 2021-01-21 PROCEDURE — 82306 VITAMIN D 25 HYDROXY: CPT

## 2021-01-21 PROCEDURE — 84100 ASSAY OF PHOSPHORUS: CPT

## 2021-01-21 PROCEDURE — 83970 ASSAY OF PARATHORMONE: CPT

## 2021-01-21 PROCEDURE — 36415 COLL VENOUS BLD VENIPUNCTURE: CPT

## 2021-01-21 PROCEDURE — 80053 COMPREHEN METABOLIC PANEL: CPT

## 2021-01-22 DIAGNOSIS — I10 ESSENTIAL HYPERTENSION: ICD-10-CM

## 2021-01-22 RX ORDER — CANDESARTAN 16 MG/1
TABLET ORAL
Qty: 30 TABLET | Refills: 5 | Status: SHIPPED | OUTPATIENT
Start: 2021-01-22 | End: 2021-07-29

## 2021-02-17 ENCOUNTER — OFFICE VISIT (OUTPATIENT)
Dept: FAMILY MEDICINE CLINIC | Facility: CLINIC | Age: 61
End: 2021-02-17
Payer: COMMERCIAL

## 2021-02-17 VITALS
BODY MASS INDEX: 32.82 KG/M2 | RESPIRATION RATE: 16 BRPM | HEIGHT: 69 IN | WEIGHT: 221.6 LBS | DIASTOLIC BLOOD PRESSURE: 82 MMHG | SYSTOLIC BLOOD PRESSURE: 118 MMHG | OXYGEN SATURATION: 99 % | HEART RATE: 91 BPM | TEMPERATURE: 96.9 F

## 2021-02-17 DIAGNOSIS — E78.5 HYPERLIPIDEMIA, UNSPECIFIED HYPERLIPIDEMIA TYPE: ICD-10-CM

## 2021-02-17 DIAGNOSIS — F41.9 ANXIETY: ICD-10-CM

## 2021-02-17 DIAGNOSIS — F32.A DEPRESSION, UNSPECIFIED DEPRESSION TYPE: ICD-10-CM

## 2021-02-17 DIAGNOSIS — E66.9 OBESITY (BMI 30-39.9): ICD-10-CM

## 2021-02-17 DIAGNOSIS — I10 ESSENTIAL HYPERTENSION: ICD-10-CM

## 2021-02-17 DIAGNOSIS — Z00.00 HEALTH CARE MAINTENANCE: Primary | ICD-10-CM

## 2021-02-17 DIAGNOSIS — E55.9 VITAMIN D DEFICIENCY: ICD-10-CM

## 2021-02-17 PROCEDURE — 3079F DIAST BP 80-89 MM HG: CPT | Performed by: FAMILY MEDICINE

## 2021-02-17 PROCEDURE — 3074F SYST BP LT 130 MM HG: CPT | Performed by: FAMILY MEDICINE

## 2021-02-17 PROCEDURE — 3725F SCREEN DEPRESSION PERFORMED: CPT | Performed by: FAMILY MEDICINE

## 2021-02-17 PROCEDURE — 99396 PREV VISIT EST AGE 40-64: CPT | Performed by: FAMILY MEDICINE

## 2021-02-17 RX ORDER — TRETINOIN 0.5 MG/G
CREAM TOPICAL
COMMUNITY
Start: 2020-12-02

## 2021-02-17 NOTE — PATIENT INSTRUCTIONS
Here for general PE and refuses colon screening and does cologuard yearly  Sees GYN and gets mammogram as directed  Take lexapro 5 mg daily and will call if interested in increasing dose for anxiety and depression  Lose weight diet and exercise  Rec sunscreen in summer and monitor for ticks  Rec red yeast rice as patient prefers supplement at this time  Recheck lipids in 6 months  Red yeast rice rec if not on prescription statin to help lower LDL cholesterol

## 2021-02-17 NOTE — PROGRESS NOTES
Assessment/Plan:  Chief Complaint   Patient presents with    Well Check     Patient Instructions   Here for general PE and refuses colon screening and does cologuard yearly  Sees GYN and gets mammogram as directed  Take lexapro 5 mg daily and will call if interested in increasing dose for anxiety and depression  Lose weight diet and exercise  Rec sunscreen in summer and monitor for ticks  Rec red yeast rice as patient prefers supplement at this time  Recheck lipids in 6 months  Red yeast rice rec if not on prescription statin to help lower LDL cholesterol  No problem-specific Assessment & Plan notes found for this encounter  Diagnoses and all orders for this visit:    Health care maintenance    Essential hypertension  -     Comprehensive metabolic panel; Future    Depression, unspecified depression type    Anxiety    Hyperlipidemia, unspecified hyperlipidemia type  -     Comprehensive metabolic panel; Future  -     Lipid panel; Future    Vitamin D deficiency    Obesity (BMI 30-39  9)    Other orders  -     tretinoin (REFISSA) 0 05 % cream; APPLY A PEA SIZED AMOUNT TO ENTIRE FACE  EVERY NIGHT AT BEDTIME  ONE HOUR AFTER WASHING FACE          Subjective:      Patient ID: Diann Bennett is a 64 y o  female  Here for general PE and is registered for COVID 19 vaccine  Sees GYN and getting mammogram  Trying to exercise also but has not been to gym and sees endo soon Dr Shravan Miller  Takes BP med as directed  Takes Vitamin D3 5000 IU daily  Anxiety and depression is stable on lexapro 5 mg daily  The following portions of the patient's history were reviewed and updated as appropriate: allergies, current medications, past family history, past medical history, past social history, past surgical history and problem list     Review of Systems   Constitutional:        Obesity   HENT: Negative  Eyes: Negative  Respiratory: Negative  Cardiovascular: Negative  Gastrointestinal: Negative      Endocrine: Negative  Genitourinary: Negative  Musculoskeletal: Negative  Skin: Negative  Allergic/Immunologic: Negative  Neurological: Negative  Hematological: Negative  Psychiatric/Behavioral:        Anxiety and depression stable         Objective:      /82   Pulse 91   Temp (!) 96 9 °F (36 1 °C) (Temporal)   Resp 16   Ht 5' 9" (1 753 m)   Wt 101 kg (221 lb 9 6 oz)   SpO2 99%   BMI 32 72 kg/m²          Physical Exam  Constitutional:       Appearance: She is well-developed  Comments: obesity   HENT:      Head: Normocephalic and atraumatic  Right Ear: External ear normal       Left Ear: External ear normal       Nose: Nose normal    Eyes:      Conjunctiva/sclera: Conjunctivae normal       Pupils: Pupils are equal, round, and reactive to light  Neck:      Musculoskeletal: Normal range of motion and neck supple  Cardiovascular:      Rate and Rhythm: Normal rate and regular rhythm  Heart sounds: Normal heart sounds  Pulmonary:      Effort: Pulmonary effort is normal       Breath sounds: Normal breath sounds  Abdominal:      General: Abdomen is flat  Bowel sounds are normal       Palpations: Abdomen is soft  Musculoskeletal: Normal range of motion  Skin:     General: Skin is warm and dry  Neurological:      Mental Status: She is alert and oriented to person, place, and time  Deep Tendon Reflexes: Reflexes are normal and symmetric     Psychiatric:         Behavior: Behavior normal       Comments: Anxiety and depression

## 2021-02-23 ENCOUNTER — OFFICE VISIT (OUTPATIENT)
Dept: ENDOCRINOLOGY | Facility: CLINIC | Age: 61
End: 2021-02-23
Payer: COMMERCIAL

## 2021-02-23 VITALS
SYSTOLIC BLOOD PRESSURE: 130 MMHG | BODY MASS INDEX: 33.3 KG/M2 | HEART RATE: 84 BPM | WEIGHT: 224.8 LBS | DIASTOLIC BLOOD PRESSURE: 90 MMHG | HEIGHT: 69 IN

## 2021-02-23 DIAGNOSIS — E83.52 SERUM CALCIUM ELEVATED: Primary | ICD-10-CM

## 2021-02-23 DIAGNOSIS — E55.9 VITAMIN D DEFICIENCY: ICD-10-CM

## 2021-02-23 PROCEDURE — 1036F TOBACCO NON-USER: CPT | Performed by: INTERNAL MEDICINE

## 2021-02-23 PROCEDURE — 3008F BODY MASS INDEX DOCD: CPT | Performed by: INTERNAL MEDICINE

## 2021-02-23 PROCEDURE — 99213 OFFICE O/P EST LOW 20 MIN: CPT | Performed by: INTERNAL MEDICINE

## 2021-02-23 NOTE — PROGRESS NOTES
Chief Complaint   Patient presents with    Abnormal Calcium      Referring Provider  Robert Gao, Do  [address]  [address] [facility],  [address]     History of Present Illness:   Zarina Dumont is a 64 y o  female with hypercalcemia seen in follow-up  She was last seen in 2020    She was seen in the office in 2020  At that time, the calcium had improved and Dr Rico Quiles recommended vitamin D supplementation and additional lab work  HPI:   She had a mildly elevated calcium in 2020 on routine labs  She denies prior known hx of hypercalcemia  Another physician also recommended checking her thyroid, but the TSH is normal   Bones 13yof broken nose  She denies hx of renal stones, constipation, or abdominal pain in the past, and has no new issues with this today    She did have repeat labs and the PTH and calcium remain normal    Dietary Calcium intake:  Cheese and green leafy veggies  Calcium/Vitamin D supplementation: no MVI, but is taking vitamin D  Weight bearing exercises: sedentary work         Uses a CPAP - CRISTELA, which she "loves "    Fhx: denies high calcium, renal stones     Patient Active Problem List   Diagnosis    Obesity, Class I, BMI 30-34 9    Abnormal weight gain    Sleep apnea    Hypertension    Serum calcium elevated    Vitamin D deficiency    Impaired fasting glucose      Past Medical History:   Diagnosis Date    Hypertension     Sleep apnea       Past Surgical History:   Procedure Laterality Date    ADENOIDECTOMY      BUNIONECTOMY      NASAL SEPTUM SURGERY      RHINOPLASTY      TONSILLECTOMY        Family History   Problem Relation Age of Onset    Diabetes Mother     Hypertension Mother     Lung cancer Father     Cancer Neg Hx     Heart disease Neg Hx     Thyroid disease Neg Hx     Stroke Neg Hx      Social History     Tobacco Use    Smoking status: Former Smoker     Types: Cigarettes     Quit date:      Years since quittin 1    Smokeless tobacco: Never Used   Substance Use Topics    Alcohol use: Yes     Frequency: Monthly or less     Drinks per session: 1 or 2     Binge frequency: Never     Comment: socially     Allergies   Allergen Reactions    Ampicillin Hives         Current Outpatient Medications:     candesartan (ATACAND) 16 mg tablet, take 1 tablet by mouth once daily, Disp: 30 tablet, Rfl: 5    Cholecalciferol 125 MCG (5000 UT) capsule, Take 1 capsule by mouth daily , Disp: , Rfl:     escitalopram (LEXAPRO) 10 mg tablet, Take 1 tablet (10 mg total) by mouth daily (Patient taking differently: Take 5 mg by mouth daily ), Disp: 30 tablet, Rfl: 5    Omega-3 Fatty Acids (FISH OIL PO), Take 2 g by mouth daily , Disp: , Rfl:     tretinoin (REFISSA) 0 05 % cream, APPLY A PEA SIZED AMOUNT TO ENTIRE FACE  EVERY NIGHT AT BEDTIME  ONE HOUR AFTER WASHING FACE, Disp: , Rfl:     tretinoin (RETIN-A) 0 025 % cream, APPLY TO FACE EVERY NIGHT AT BEDTIME TO TREAT AND PREVENT ACNE, Disp: , Rfl: 3  Review of Systems   Constitutional: Positive for fatigue  HENT: Negative for voice change  Eyes: Negative for visual disturbance  Respiratory: Negative for shortness of breath  Cardiovascular: Negative for palpitations  Gastrointestinal: Negative for abdominal pain and constipation  Neurological: Positive for weakness  Negative for tremors  see also HPI    Physical Exam:  Body mass index is 33 2 kg/m²    /90   Pulse 84   Ht 5' 9" (1 753 m)   Wt 102 kg (224 lb 12 8 oz)   BMI 33 20 kg/m²    Wt Readings from Last 3 Encounters:   02/23/21 102 kg (224 lb 12 8 oz)   02/17/21 101 kg (221 lb 9 6 oz)   11/03/20 99 4 kg (219 lb 3 2 oz)       GEN: NAD  E/n/m mask in place, hearing intact bilat, tongue midline, lips nl  Eyes: no stare or proptosis, EOMI  CV; heart reg rate s1s2 nl, no m/r/g appreciated  Resp: CTAB, good effort  Ab+BS  Neuro: no tremor  MS: no c/c in digits, moves all 4 ext, nl muscle bulk, gait nl  Skin: warm and dry, no palmar erythema  Psych: nl mood and affect, no gross lapses in memory    DATA:  Labs:         Lab Results   Component Value Date    SODIUM 137 01/21/2021    K 4 4 01/21/2021     01/21/2021    CO2 29 01/21/2021    BUN 16 01/21/2021    CREATININE 0 95 01/21/2021    CALCIUM 9 9 01/21/2021      Lab Results   Component Value Date    PTH 36 0 01/21/2021    CALCIUM 9 9 01/21/2021    PHOS 3 4 01/21/2021 8/2020  25-oh D: 24  TSH 2 29    1/2021  25 OH D- 41 4  Lab Results   Component Value Date    PTH 36 0 01/21/2021    CALCIUM 9 9 01/21/2021    PHOS 3 4 01/21/2021         Radiology     n/a      Impression:  1  Serum calcium elevated           Plan:    Kayleigh Singleton was seen today for abnormal calcium  Diagnoses and all orders for this visit:    Serum calcium elevated  -     Comprehensive metabolic panel Lab Collect; Future        PLAN   1  Hypercalcemia:  Labs appear better  We will plan for one more set of labs in August  If the calcium remains normal, then she can return to routine care with Dr Andie Muniz    2  Vitamin D deficiency: continue daily vitamin D supplementation    Discussed with the patient and all questioned fully answered  She will call me if any problems arise          Christopher Greenwood MD

## 2021-03-29 ENCOUNTER — TELEPHONE (OUTPATIENT)
Dept: FAMILY MEDICINE CLINIC | Facility: CLINIC | Age: 61
End: 2021-03-29

## 2021-03-29 NOTE — TELEPHONE ENCOUNTER
So I see that and that's perfect, but the note also needs to reflect health maintenance as primary and currently hyperlipidemia is primary  I'm pretty sure if you addend your note you can change the order

## 2021-03-29 NOTE — TELEPHONE ENCOUNTER
----- Message from Patricia Colby, Edu Bey sent at 3/29/2021  1:53 PM EDT -----  Pt has a question regarding an invoice date of services was from 02/17/21  Pt spoke to someone from our office 03/15 it was not to be coded as preventive diagnostic  Someone in our office was looking into this and never received a call back   It was discussed that we were going to look into if this cold be re-coded   Loretta's number is [phone]

## 2021-03-29 NOTE — TELEPHONE ENCOUNTER
Hi Dr John Duarte, patient received a bill for DOS 2/17/2021 even know it was billed as preventive  After review, can you please addend your note for DOS 2/17/21 and make health care maintenance your first diagnose under the heading, "diagnoses and all orders for this visit"    Thank you!

## 2021-04-09 DIAGNOSIS — Z23 ENCOUNTER FOR IMMUNIZATION: ICD-10-CM

## 2021-04-14 ENCOUNTER — VBI (OUTPATIENT)
Dept: ADMINISTRATIVE | Facility: OTHER | Age: 61
End: 2021-04-14

## 2021-04-21 DIAGNOSIS — F41.9 ANXIETY: ICD-10-CM

## 2021-04-21 DIAGNOSIS — F32.A DEPRESSION, UNSPECIFIED DEPRESSION TYPE: ICD-10-CM

## 2021-04-21 RX ORDER — ESCITALOPRAM OXALATE 10 MG/1
TABLET ORAL
Qty: 30 TABLET | Refills: 5 | Status: SHIPPED | OUTPATIENT
Start: 2021-04-21

## 2021-05-07 NOTE — TELEPHONE ENCOUNTER
I spoke to the patient, she takes Lexapro 5 mg daily but gets the prescription for 10 mg tablets and breaks them in half

## 2021-06-28 ENCOUNTER — VBI (OUTPATIENT)
Dept: ADMINISTRATIVE | Facility: OTHER | Age: 61
End: 2021-06-28

## 2021-07-29 DIAGNOSIS — I10 ESSENTIAL HYPERTENSION: ICD-10-CM

## 2021-07-29 RX ORDER — CANDESARTAN 16 MG/1
TABLET ORAL
Qty: 30 TABLET | Refills: 5 | Status: SHIPPED | OUTPATIENT
Start: 2021-07-29 | End: 2022-01-24

## 2021-08-09 ENCOUNTER — VBI (OUTPATIENT)
Dept: ADMINISTRATIVE | Facility: OTHER | Age: 61
End: 2021-08-09

## 2021-08-09 NOTE — TELEPHONE ENCOUNTER
08/09/21 12:19 PM     See documentation in the [employer] SmartForm     Will be due 8-  Drea Ruiz MA

## 2021-08-11 ENCOUNTER — RA CDI HCC (OUTPATIENT)
Dept: OTHER | Facility: HOSPITAL | Age: 61
End: 2021-08-11

## 2021-08-11 NOTE — PROGRESS NOTES
[facility]  coding opportunities       Chart reviewed, no opportunity found: CHART REVIEWED, NO OPPORTUNITY FOUND                        Patients insurance company: [employer] (Medicare Advantage and Commercial)

## 2021-08-31 ENCOUNTER — APPOINTMENT (OUTPATIENT)
Dept: LAB | Facility: CLINIC | Age: 61
End: 2021-08-31
Payer: COMMERCIAL

## 2021-08-31 ENCOUNTER — OFFICE VISIT (OUTPATIENT)
Dept: ENDOCRINOLOGY | Facility: CLINIC | Age: 61
End: 2021-08-31
Payer: COMMERCIAL

## 2021-08-31 VITALS
BODY MASS INDEX: 33.62 KG/M2 | WEIGHT: 227 LBS | SYSTOLIC BLOOD PRESSURE: 148 MMHG | HEART RATE: 90 BPM | HEIGHT: 69 IN | DIASTOLIC BLOOD PRESSURE: 90 MMHG

## 2021-08-31 DIAGNOSIS — E55.9 VITAMIN D DEFICIENCY: ICD-10-CM

## 2021-08-31 DIAGNOSIS — E83.52 SERUM CALCIUM ELEVATED: Primary | ICD-10-CM

## 2021-08-31 DIAGNOSIS — E83.52 SERUM CALCIUM ELEVATED: ICD-10-CM

## 2021-08-31 LAB
25(OH)D3 SERPL-MCNC: 44.6 NG/ML (ref 30–100)
ALBUMIN SERPL BCP-MCNC: 3.7 G/DL (ref 3.5–5)
ALP SERPL-CCNC: 73 U/L (ref 46–116)
ALT SERPL W P-5'-P-CCNC: 31 U/L (ref 12–78)
ANION GAP SERPL CALCULATED.3IONS-SCNC: 6 MMOL/L (ref 4–13)
AST SERPL W P-5'-P-CCNC: 15 U/L (ref 5–45)
BILIRUB SERPL-MCNC: 0.46 MG/DL (ref 0.2–1)
BUN SERPL-MCNC: 13 MG/DL (ref 5–25)
CA-I BLD-SCNC: 1.16 MMOL/L (ref 1.12–1.32)
CALCIUM SERPL-MCNC: 9.3 MG/DL (ref 8.3–10.1)
CHLORIDE SERPL-SCNC: 104 MMOL/L (ref 100–108)
CO2 SERPL-SCNC: 26 MMOL/L (ref 21–32)
CREAT SERPL-MCNC: 0.84 MG/DL (ref 0.6–1.3)
GFR SERPL CREATININE-BSD FRML MDRD: 77 ML/MIN/1.73SQ M
GLUCOSE P FAST SERPL-MCNC: 108 MG/DL (ref 65–99)
POTASSIUM SERPL-SCNC: 4.1 MMOL/L (ref 3.5–5.3)
PROT SERPL-MCNC: 8.2 G/DL (ref 6.4–8.2)
PTH-INTACT SERPL-MCNC: 37.2 PG/ML (ref 18.4–80.1)
SODIUM SERPL-SCNC: 136 MMOL/L (ref 136–145)

## 2021-08-31 PROCEDURE — 82330 ASSAY OF CALCIUM: CPT

## 2021-08-31 PROCEDURE — 3008F BODY MASS INDEX DOCD: CPT | Performed by: INTERNAL MEDICINE

## 2021-08-31 PROCEDURE — 80053 COMPREHEN METABOLIC PANEL: CPT

## 2021-08-31 PROCEDURE — 82306 VITAMIN D 25 HYDROXY: CPT

## 2021-08-31 PROCEDURE — 1036F TOBACCO NON-USER: CPT | Performed by: INTERNAL MEDICINE

## 2021-08-31 PROCEDURE — 36415 COLL VENOUS BLD VENIPUNCTURE: CPT

## 2021-08-31 PROCEDURE — 99214 OFFICE O/P EST MOD 30 MIN: CPT | Performed by: INTERNAL MEDICINE

## 2021-08-31 PROCEDURE — 83970 ASSAY OF PARATHORMONE: CPT

## 2021-08-31 NOTE — PATIENT INSTRUCTIONS
Additional site for calcium rich foods are listed at the [address] [place] [address]      Calcium and Osteoporosis   AMBULATORY CARE:   Why calcium is important for osteoporosis:  Calcium is important for osteoporosis because calcium helps build bone mass  Osteoporosis is a long-term medical condition that causes your body to break down more bone than it makes  Your bones become weak, brittle, and more likely to fracture  Your calcium needs:   · Women:      ? 19 to 50 years: 1,000 mg    ? Over 50: 1,200 mg    ? Pregnant or breastfeeding, 19 years to 50 years: 1,000 mg    · Men:      ? 19 to 70: 1,000 mg    ? Over 70: 1,200 mg    Foods that are high in calcium: The following list shows the number of calcium milligrams (mg) per serving  Your dietitian or healthcare provider can help you create a balanced meal plan for your calcium needs  · Dairy:      ? 1 cup of low-fat plain yogurt (415 mg) or low-fat fruit yogurt (245 to 384 mg)    ? 1½ ounces of shredded cheddar cheese (306 mg) or part skim mozzarella cheese (275 mg)    ? 1 cup of skim, 2%, or whole milk (300 mg)    ? 1 cup of cottage cheese made with 2% milk fat (138 mg)    ? ½ cup of frozen yogurt (103 mg)    · Other foods:      ? 1 cup of calcium-fortified orange juice (300 mg)    ? ½ cup of cooked raji greens (220 mg)    ? 4 canned sardines, with bones (242 mg)    ? ½ cup of tofu (with added calcium) (204 mg)     How to get extra calcium:   · Add powdered milk to puddings, cocoa, custard, or hot cereal     · Sift powdered milk into flour when you make cakes, cookies, or breads  · Use low-fat or fat-free milk instead of water in pancake mix, mashed potatoes, pudding, or hot breakfast cereal     · Add low-fat or fat-free cheese to salad, soup, or pasta  · Add tofu (with added calcium) to vegetable stir-bethea  · Take calcium supplements if you cannot get enough calcium from the foods you eat   Your body can absorb the most calcium from supplements when you take 500 mg or less at one time  Do not take more than 2,500 mg of calcium supplements each day  Follow up with your doctor or dietitian as directed:  Write down your questions so you remember to ask them during your visits  © Copyright [employer] 2021 Information is for End User's use only and may not be sold, redistributed or otherwise used for commercial purposes  All illustrations and images included in CareNotes® are the copyrighted property of A D A M , Inc  or [facility] Kenneth Zepeda   The above information is an  only  It is not intended as medical advice for individual conditions or treatments  Talk to your doctor, nurse or pharmacist before following any medical regimen to see if it is safe and effective for you

## 2021-08-31 NOTE — PROGRESS NOTES
Chief Complaint   Patient presents with    Abnormal Calcium      Referring Provider  Opal Anderson,   [address]  [address] [facility],  [address]     History of Present Illness:   Juanjo Wilder is a 64 y o  female with abnormal calciuma dn vit D deficiency seen in follow-up  She was last seen in 2021  Jordan Spruce HPI:   She had a mildly elevated calcium in 2020 on routine labs  She denies prior known hx of hypercalcemia  Her labs have improved since then  There is no hx of fracture except when 13yrs old having a broken nose  She denies hx of renal stones, constipation, or abdominal pain in the past, and has no new issues with this today  She reports some fatigue, but is able to do all activities  She is trying to exercise more, but has some life stressors with family and work    Dietary Calcium intake:  Plant based diet  Will have Cheese  Spring mixed veggies, tofu, some broccoli  Less kale  Calcium/Vitamin D supplementation: Vitamin D 5000units  Weight bearing exercises: sedentary work         Uses a CPAP -using  CRISTELA  Fhx: denies high calcium, renal stones     Patient Active Problem List   Diagnosis    Obesity, Class I, BMI 30-34 9    Abnormal weight gain    Sleep apnea    Hypertension    Serum calcium elevated    Vitamin D deficiency    Impaired fasting glucose      Past Medical History:   Diagnosis Date    Hypertension     Sleep apnea       Past Surgical History:   Procedure Laterality Date    ADENOIDECTOMY      BUNIONECTOMY      NASAL SEPTUM SURGERY      RHINOPLASTY      TONSILLECTOMY        Family History   Problem Relation Age of Onset    Diabetes Mother     Hypertension Mother     Lung cancer Father     Cancer Neg Hx     Heart disease Neg Hx     Thyroid disease Neg Hx     Stroke Neg Hx      Social History     Tobacco Use    Smoking status: Former Smoker     Types: Cigarettes     Quit date:      Years since quittin 6    Smokeless tobacco: Never Used   Substance Use Topics    Alcohol use: Yes     Comment: socially     Allergies   Allergen Reactions    Ampicillin Hives         Current Outpatient Medications:     candesartan (ATACAND) 16 mg tablet, take 1 tablet by mouth once daily, Disp: 30 tablet, Rfl: 5    Cholecalciferol 125 MCG (5000 UT) capsule, Take 1 capsule by mouth daily , Disp: , Rfl:     escitalopram (LEXAPRO) 10 mg tablet, take 1 tablet by mouth once daily (Patient taking differently: 10 mg Takes 5 mg daily), Disp: 30 tablet, Rfl: 5    Omega-3 Fatty Acids (FISH OIL PO), Take 2 g by mouth daily , Disp: , Rfl:     tretinoin (REFISSA) 0 05 % cream, APPLY A PEA SIZED AMOUNT TO ENTIRE FACE  EVERY NIGHT AT BEDTIME  ONE HOUR AFTER WASHING FACE, Disp: , Rfl:     tretinoin (RETIN-A) 0 025 % cream, APPLY TO FACE EVERY NIGHT AT BEDTIME TO TREAT AND PREVENT ACNE (Patient not taking: Reported on 8/31/2021), Disp: , Rfl: 3  Review of Systems   Constitutional: Positive for fatigue  HENT: Negative for hearing loss, trouble swallowing and voice change  Eyes: Negative for visual disturbance  Respiratory: Negative for shortness of breath  Cardiovascular: Negative for palpitations  Gastrointestinal: Negative for abdominal pain and constipation  Neurological: Negative for tremors  Psychiatric/Behavioral: The patient is not nervous/anxious  see also HPI    Physical Exam:  Body mass index is 33 52 kg/m²    /90   Pulse 90   Ht 5' 9" (1 753 m)   Wt 103 kg (227 lb)   BMI 33 52 kg/m²    Wt Readings from Last 3 Encounters:   08/31/21 103 kg (227 lb)   02/23/21 102 kg (224 lb 12 8 oz)   02/17/21 101 kg (221 lb 9 6 oz)       GEN: NAD  E/n/m mask in place, hearing intact bilat, tongue midline, lips nl  Eyes: no stare or proptosis, EOMI  Resp: good effort  Ab+BS  Neuro: no tremor  MS: no c/c in digits, moves all 4 ext, nl muscle bulk, gait nl  Skin: warm and dry, no palmar erythema  Psych: nl mood and affect, no gross lapses in memory    DATA:  Labs:         Lab Results   Component Value Date    SODIUM 137 01/21/2021    K 4 4 01/21/2021     01/21/2021    CO2 29 01/21/2021    BUN 16 01/21/2021    CREATININE 0 95 01/21/2021    CALCIUM 9 9 01/21/2021      Lab Results   Component Value Date    PTH 36 0 01/21/2021    CALCIUM 9 9 01/21/2021    PHOS 3 4 01/21/2021 8/2020  25-oh D: 24  TSH 2 29    1/2021  25 OH D- 41 4  Lab Results   Component Value Date    PTH 36 0 01/21/2021    CALCIUM 9 9 01/21/2021    PHOS 3 4 01/21/2021         Radiology     n/a      Impression:  1  Serum calcium elevated    2  Vitamin D deficiency           Plan:    Rosaura Mi was seen today for abnormal calcium  Diagnoses and all orders for this visit:    Serum calcium elevated  -     PTH, intact- Lab Collect; Future  -     Calcium, ionized; Future    Vitamin D deficiency  -     Vitamin D 25 hydroxy; Future        PLAN   1  Hypercalcemia:  Labs have improved  Will check ionized calcium and PTH  If the calcium remains normal, then she can return to routine care with Dr Annmarie Strong    2  Vitamin D deficiency: continue daily vitamin D supplementation 5000units daily  Check D with these labs  Discussed with the patient and all questioned fully answered           Orlene Schlatter, MD

## 2021-09-01 ENCOUNTER — TELEPHONE (OUTPATIENT)
Dept: ENDOCRINOLOGY | Facility: CLINIC | Age: 61
End: 2021-09-01

## 2021-09-01 NOTE — TELEPHONE ENCOUNTER
----- Message from Mara Glaser MD sent at 8/31/2021  5:14 PM EDT -----  Please let her know all labs are back to normal  Thanks

## 2022-01-22 DIAGNOSIS — I10 ESSENTIAL HYPERTENSION: ICD-10-CM

## 2022-01-24 DIAGNOSIS — I10 ESSENTIAL HYPERTENSION: ICD-10-CM

## 2022-01-24 RX ORDER — CANDESARTAN 16 MG/1
TABLET ORAL
Qty: 30 TABLET | Refills: 5 | Status: SHIPPED | OUTPATIENT
Start: 2022-01-24 | End: 2022-01-24 | Stop reason: SDUPTHER

## 2022-01-24 RX ORDER — CANDESARTAN 16 MG/1
16 TABLET ORAL DAILY
Qty: 30 TABLET | Refills: 5 | Status: SHIPPED | OUTPATIENT
Start: 2022-01-24 | End: 2022-07-12

## 2022-04-08 ENCOUNTER — TELEPHONE (OUTPATIENT)
Dept: FAMILY MEDICINE CLINIC | Facility: CLINIC | Age: 62
End: 2022-04-08

## 2022-04-08 NOTE — TELEPHONE ENCOUNTER
[address] [facility] called the office regarding pt uses them for her c pap supplies  They need to confirm date of pt's last appointment  Pt was last seen 02/17/21

## 2022-06-03 ENCOUNTER — OFFICE VISIT (OUTPATIENT)
Dept: FAMILY MEDICINE CLINIC | Facility: CLINIC | Age: 62
End: 2022-06-03
Payer: COMMERCIAL

## 2022-06-03 ENCOUNTER — HOSPITAL ENCOUNTER (OUTPATIENT)
Dept: RADIOLOGY | Facility: HOSPITAL | Age: 62
Discharge: HOME | End: 2022-06-03
Payer: COMMERCIAL

## 2022-06-03 VITALS
OXYGEN SATURATION: 95 % | HEART RATE: 92 BPM | WEIGHT: 189 LBS | HEIGHT: 69 IN | TEMPERATURE: 96 F | BODY MASS INDEX: 27.99 KG/M2 | DIASTOLIC BLOOD PRESSURE: 78 MMHG | SYSTOLIC BLOOD PRESSURE: 102 MMHG

## 2022-06-03 DIAGNOSIS — M54.32 SCIATICA OF LEFT SIDE: Primary | ICD-10-CM

## 2022-06-03 DIAGNOSIS — E66.3 OVERWEIGHT (BMI 25.0-29.9): ICD-10-CM

## 2022-06-03 DIAGNOSIS — M54.42 CHRONIC LEFT-SIDED LOW BACK PAIN WITH LEFT-SIDED SCIATICA: ICD-10-CM

## 2022-06-03 DIAGNOSIS — I10 PRIMARY HYPERTENSION: ICD-10-CM

## 2022-06-03 DIAGNOSIS — M54.32 SCIATICA OF LEFT SIDE: ICD-10-CM

## 2022-06-03 DIAGNOSIS — G89.29 CHRONIC LEFT-SIDED LOW BACK PAIN WITH LEFT-SIDED SCIATICA: ICD-10-CM

## 2022-06-03 PROCEDURE — 99214 OFFICE O/P EST MOD 30 MIN: CPT | Performed by: FAMILY MEDICINE

## 2022-06-03 PROCEDURE — 3725F SCREEN DEPRESSION PERFORMED: CPT | Performed by: FAMILY MEDICINE

## 2022-06-03 PROCEDURE — 72100 X-RAY EXAM L-S SPINE 2/3 VWS: CPT

## 2022-06-03 RX ORDER — TOPIRAMATE 25 MG/1
25 TABLET ORAL 2 TIMES DAILY
COMMUNITY
Start: 2022-04-23

## 2022-06-03 RX ORDER — CYCLOBENZAPRINE HCL 10 MG
10 TABLET ORAL DAILY PRN
Qty: 30 TABLET | Refills: 0 | Status: SHIPPED | OUTPATIENT
Start: 2022-06-03

## 2022-06-03 RX ORDER — MELOXICAM 7.5 MG/1
7.5 TABLET ORAL DAILY
Qty: 30 TABLET | Refills: 0 | Status: SHIPPED | OUTPATIENT
Start: 2022-06-03

## 2022-06-03 RX ORDER — PITAVASTATIN CALCIUM 4.18 MG/1
1 TABLET, FILM COATED ORAL DAILY
COMMUNITY
Start: 2022-03-14

## 2022-06-03 NOTE — PROGRESS NOTES
Assessment/Plan:  Chief Complaint   Patient presents with    Follow-up     Sciatica pain x 3 weeks, also just switched chriopractors     Patient Instructions   Here for left sciatica and present 3 weeks and failed chiropractic work and will rec orthopedics and lumbar xray for left sciatica  Rec starting Meloxicam prn and mm relaxant  May use Miri Pott or [employer] prn  Avoid chiropractic work for now until cleared by orthopedics  Denies fall or change in urine or BM  No problem-specific Assessment & Plan notes found for this encounter  Diagnoses and all orders for this visit:    Sciatica of left side  -     XR spine lumbar 2 or 3 views injury; Future  -     meloxicam (Mobic) 7 5 mg tablet; Take 1 tablet (7 5 mg total) by mouth daily  -     cyclobenzaprine (FLEXERIL) 10 mg tablet; Take 1 tablet (10 mg total) by mouth daily as needed for muscle spasms  -     Ambulatory Referral to Orthopedic Surgery; Future    Chronic left-sided low back pain with left-sided sciatica  -     XR spine lumbar 2 or 3 views injury; Future  -     meloxicam (Mobic) 7 5 mg tablet; Take 1 tablet (7 5 mg total) by mouth daily  -     cyclobenzaprine (FLEXERIL) 10 mg tablet; Take 1 tablet (10 mg total) by mouth daily as needed for muscle spasms  -     Ambulatory Referral to Orthopedic Surgery; Future    Primary hypertension    Overweight (BMI 25 0-29  9)    Other orders  -     topiramate (TOPAMAX) 25 mg tablet; Take 25 mg by mouth 2 (two) times a day  -     Livalo 4 MG TABS; Take 1 tablet by mouth daily (Patient not taking: Reported on 6/3/2022)          Subjective:      Patient ID: Rogers  is a 63 y o  female  Follow-up (Sciatica pain x 3 weeks, also just switched chriopractors) No blood in stool or urine and urinating ok         The following portions of the patient's history were reviewed and updated as appropriate: allergies, current medications, past family history, past medical history, past social history, past surgical history and problem list     Review of Systems   Constitutional: Negative  HENT: Negative  Eyes: Negative  Respiratory: Negative  Cardiovascular: Negative  Gastrointestinal: Negative  Endocrine: Negative  Genitourinary: Negative  Musculoskeletal:        Sciatica left leg and no relief for this  Saw another chiropractor  Skin: Negative  Allergic/Immunologic: Negative  Neurological: Negative  Hematological: Negative  Psychiatric/Behavioral: Negative  Objective:      /78 (BP Location: Left arm, Patient Position: Sitting, Cuff Size: Large)   Pulse 92   Temp (!) 96 °F (35 6 °C) (Temporal)   Ht 5' 8 5" (1 74 m)   Wt 85 7 kg (189 lb)   SpO2 95%   BMI 28 32 kg/m²          Physical Exam  Constitutional:       Appearance: She is well-developed  HENT:      Head: Normocephalic and atraumatic  Right Ear: External ear normal       Left Ear: External ear normal       Nose: Nose normal    Eyes:      Conjunctiva/sclera: Conjunctivae normal       Pupils: Pupils are equal, round, and reactive to light  Cardiovascular:      Rate and Rhythm: Normal rate and regular rhythm  Heart sounds: Normal heart sounds  Pulmonary:      Effort: Pulmonary effort is normal       Breath sounds: Normal breath sounds  Musculoskeletal:         General: Normal range of motion  Cervical back: Normal range of motion and neck supple  Comments: Low back pain and left sciatica   Skin:     General: Skin is warm and dry  Neurological:      Mental Status: She is alert and oriented to person, place, and time  Deep Tendon Reflexes: Reflexes are normal and symmetric     Psychiatric:         Behavior: Behavior normal

## 2022-06-03 NOTE — PATIENT INSTRUCTIONS
Here for left sciatica and present 3 weeks and failed chiropractic work and will rec orthopedics and lumbar xray for left sciatica  Rec starting Meloxicam prn and mm relaxant  May use Erminio Quant or [employer] prn  Avoid chiropractic work for now until cleared by orthopedics  Denies fall or change in urine or BM  Yes...

## 2022-06-06 ENCOUNTER — OFFICE VISIT (OUTPATIENT)
Dept: OBGYN CLINIC | Facility: MEDICAL CENTER | Age: 62
End: 2022-06-06
Payer: COMMERCIAL

## 2022-06-06 ENCOUNTER — TELEPHONE (OUTPATIENT)
Dept: ADMINISTRATIVE | Facility: OTHER | Age: 62
End: 2022-06-06

## 2022-06-06 VITALS
HEART RATE: 93 BPM | WEIGHT: 189 LBS | HEIGHT: 69 IN | SYSTOLIC BLOOD PRESSURE: 129 MMHG | BODY MASS INDEX: 27.99 KG/M2 | DIASTOLIC BLOOD PRESSURE: 85 MMHG

## 2022-06-06 DIAGNOSIS — M47.816 LUMBAR FACET ARTHROPATHY: ICD-10-CM

## 2022-06-06 DIAGNOSIS — M54.50 ACUTE LEFT-SIDED LOW BACK PAIN WITHOUT SCIATICA: Primary | ICD-10-CM

## 2022-06-06 PROCEDURE — 1036F TOBACCO NON-USER: CPT | Performed by: EMERGENCY MEDICINE

## 2022-06-06 PROCEDURE — 99243 OFF/OP CNSLTJ NEW/EST LOW 30: CPT | Performed by: EMERGENCY MEDICINE

## 2022-06-06 PROCEDURE — 3008F BODY MASS INDEX DOCD: CPT | Performed by: EMERGENCY MEDICINE

## 2022-06-06 NOTE — TELEPHONE ENCOUNTER
----- Message from Radha Carroll sent at 6/3/2022  2:12 PM EDT -----  Regarding: care gap request  Mammo  06/03/22 2:12 PM    Hello, our patient attached above has had Mammogram completed/performed  Please assist in updating the patient chart by pulling the Care Everywhere (CE) document  The date of service is 4/11/2022       Thank you,  Radha Carroll  [phone]

## 2022-06-06 NOTE — LETTER
June 6, 2022     Patient: Demond Kurtz  YOB: 1960  Date of Visit: 6/6/2022      To Whom it May Concern:    Demond Kurtz is under my professional care  Gianluca Moya was seen in my office on 6/6/2022  Please allow to work from home due to medical condition  F/U 6 weeks  If you have any questions or concerns, please don't hesitate to call           Sincerely,          Kendy Gutierrez MD        CC: No Recipients

## 2022-06-06 NOTE — TELEPHONE ENCOUNTER
----- Message from Rolando Humphrey sent at 6/3/2022  2:13 PM EDT -----  Regarding: care gap request PAP  06/03/22 2:13 PM    Hello, our patient attached above has had Pap Smear (HPV) aka Cervical Cancer Screening completed/performed  Please assist in updating the patient chart by pulling the Care Everywhere (CE) document  The date of service is 11/17/2021       Thank you,  Rolando Humphrey  [phone]

## 2022-06-06 NOTE — PROGRESS NOTES
Assessment/Plan:    Diagnoses and all orders for this visit:    Acute left-sided low back pain without sciatica  -     Ambulatory Referral to Orthopedic Surgery  -     Ambulatory Referral to Physical Therapy; Future    Lumbar facet arthropathy  -     Ambulatory Referral to Orthopedic Surgery  -     Ambulatory Referral to Physical Therapy; Future    Continue Mobic, start PT  Neurologically intact on exam  Reviewed PCP note and Xrays    Return in about 6 weeks (around 7/18/2022)  Chief Complaint:     Chief Complaint   Patient presents with    Spine - Pain       Subjective:   Patient ID: Veronica Sweet is a 63 y o  female  NP presents referred by Dr Huy Chin for left lower back and thigh pain x 3 weeks  Symptoms are worse with prolonged sitting especially during car rides  She has been working with her chiropractor  Started taking Mobic      Review of Systems    The following portions of the patient's chart were reviewed and updated as appropriate:      Allergie  Allergies   Allergen Reactions    Ampicillin Hives   s   Allergen Reactions    Ampicillin Hives      Diagnosis Date    Hypertension     Sleep apnea        Past Surgical History:   Procedure Laterality Date    ADENOIDECTOMY      BUNIONECTOMY      NASAL SEPTUM SURGERY      RHINOPLASTY      TONSILLECTOMY         Social History     Socioeconomic History    Marital status: /Civil Union     Spouse name: Not on file    Number of children: Not on file    Years of education: Not on file    Highest education level: Not on file   Occupational History    Not on file   Tobacco Use    Smoking status: Former Smoker     Types: Cigarettes     Quit date: 2012     Years since quitting: 10 4    Smokeless tobacco: Never Used   Vaping Use    Vaping Use: Never used   Substance and Sexual Activity    Alcohol use: Yes     Comment: socially    Drug use: No    Sexual activity: Not on file   Other Topics Concern    Not on file   Social History Narrative    Consumes 1 cup of coffee per day     Social Determinants of Health     Financial Resource Strain: Not on file   Food Insecurity: Not on file   Transportation Needs: Not on file   Physical Activity: Not on file   Stress: Not on file   Social Connections: Not on file   Intimate Partner Violence: Not on file   Housing Stability: Not on file       Family History   Problem Relation Age of Onset    Diabetes Mother     Hypertension Mother     Lung cancer Father     Cancer Neg Hx     Heart disease Neg Hx     Thyroid disease Neg Hx     Stroke Neg Hx        Medications:    Current Outpatient Medications:     candesartan (ATACAND) 16 mg tablet, Take 1 tablet (16 mg total) by mouth daily, Disp: 30 tablet, Rfl: 5    Cholecalciferol 125 MCG (5000 UT) capsule, Take 1 capsule by mouth daily , Disp: , Rfl:     cyclobenzaprine (FLEXERIL) 10 mg tablet, Take 1 tablet (10 mg total) by mouth daily as needed for muscle spasms, Disp: 30 tablet, Rfl: 0    escitalopram (LEXAPRO) 10 mg tablet, take 1 tablet by mouth once daily (Patient taking differently: 10 mg Takes 5 mg daily), Disp: 30 tablet, Rfl: 5    meloxicam (Mobic) 7 5 mg tablet, Take 1 tablet (7 5 mg total) by mouth daily, Disp: 30 tablet, Rfl: 0    Omega-3 Fatty Acids (FISH OIL PO), Take 2 g by mouth daily , Disp: , Rfl:     topiramate (TOPAMAX) 25 mg tablet, Take 25 mg by mouth 2 (two) times a day, Disp: , Rfl:     tretinoin (REFISSA) 0 05 % cream, APPLY A PEA SIZED AMOUNT TO ENTIRE FACE  EVERY NIGHT AT BEDTIME  ONE HOUR AFTER WASHING FACE, Disp: , Rfl:     Livalo 4 MG TABS, Take 1 tablet by mouth daily (Patient not taking: No sig reported), Disp: , Rfl:     tretinoin (RETIN-A) 0 025 % cream, APPLY TO FACE EVERY NIGHT AT BEDTIME TO TREAT AND PREVENT ACNE (Patient not taking: No sig reported), Disp: , Rfl: 3    Patient Active Problem List   Diagnosis    Obesity, Class I, BMI 30-34 9    Abnormal weight gain    Sleep apnea    Hypertension    Serum calcium elevated    Vitamin D deficiency    Impaired fasting glucose       Objective:  /85   Pulse 93   Ht 5' 8 5" (1 74 m)   Wt 85 7 kg (189 lb)   BMI 28 32 kg/m²     Back Exam     Range of Motion   The patient has normal back ROM  Muscle Strength   The patient has normal back strength  Tests   Straight leg raise right: negative  Straight leg raise left: negative    Reflexes   Patellar: normal    Other   Toe walk: normal  Heel walk: normal  Sensation: normal  Erythema: no back redness            Physical Exam  Musculoskeletal:      Lumbar back: Negative right straight leg raise test and negative left straight leg raise test            Neurologic Exam    Procedures    I have personally reviewed pertinent films in PACS and my interpretation is x-rays lumbar spine reveal normal curvature and alignment there disc spaces and vertebral heights are preserved there is degenerative changes of the L5-S1 facets

## 2022-06-06 NOTE — TELEPHONE ENCOUNTER
Upon review of the In Basket request we were able to locate, review, and update the patient chart as requested for Mammogram and Pap Smear (HPV) aka Cervical Cancer Screening  Any additional questions or concerns should be emailed to the Practice Liaisons via [email or web address] email, please do not reply via In Basket      Thank you  Doug Duane

## 2022-06-06 NOTE — PATIENT INSTRUCTIONS
While taking meloxicam do not take any other NSAIDs such as Advil, Motrin, ibuprofen, naproxen or Aleve  However you may take Tylenol    You may also take Tylenol 500mg every 4-6 hours as needed OR max 1,000mg per dose up to 3 times per day for a total of 3,000mg per day

## 2022-06-08 ENCOUNTER — TELEPHONE (OUTPATIENT)
Dept: OBGYN CLINIC | Facility: HOSPITAL | Age: 62
End: 2022-06-08

## 2022-06-08 DIAGNOSIS — M54.50 ACUTE LEFT-SIDED LOW BACK PAIN WITHOUT SCIATICA: ICD-10-CM

## 2022-06-08 DIAGNOSIS — M47.816 LUMBAR FACET ARTHROPATHY: Primary | ICD-10-CM

## 2022-06-08 NOTE — TELEPHONE ENCOUNTER
Called & spoke to patient  I let her know Dr Murali Miller added an order for an MRI  Gave hr scheduling # [id number]  She will call if she needs an [facility]

## 2022-06-08 NOTE — TELEPHONE ENCOUNTER
Dr Henna Montes: MRI  # [phone]     Patient called asking if Dr Danielle Coleman can order MRI of her back    Patient states she was seeing a chiropractor who was doing Pt for her; but she has not completed or scheduled PT ordered by DR Mark Fulton    Patient is worried she has a disc problem or arthritis and is requesting further imaging

## 2022-06-29 ENCOUNTER — HOSPITAL ENCOUNTER (OUTPATIENT)
Dept: MRI IMAGING | Facility: HOSPITAL | Age: 62
Discharge: HOME | End: 2022-06-29
Attending: EMERGENCY MEDICINE
Payer: COMMERCIAL

## 2022-06-29 DIAGNOSIS — M54.50 ACUTE LEFT-SIDED LOW BACK PAIN WITHOUT SCIATICA: ICD-10-CM

## 2022-06-29 DIAGNOSIS — M47.816 LUMBAR FACET ARTHROPATHY: ICD-10-CM

## 2022-06-29 PROCEDURE — G1004 CDSM NDSC: HCPCS

## 2022-06-29 PROCEDURE — 72148 MRI LUMBAR SPINE W/O DYE: CPT

## 2022-07-12 DIAGNOSIS — I10 ESSENTIAL HYPERTENSION: ICD-10-CM

## 2022-07-12 RX ORDER — CANDESARTAN 16 MG/1
TABLET ORAL
Qty: 30 TABLET | Refills: 5 | Status: SHIPPED | OUTPATIENT
Start: 2022-07-12

## 2022-07-18 ENCOUNTER — EVALUATION (OUTPATIENT)
Dept: PHYSICAL THERAPY | Facility: REHABILITATION | Age: 62
End: 2022-07-18
Payer: COMMERCIAL

## 2022-07-18 ENCOUNTER — OFFICE VISIT (OUTPATIENT)
Dept: OBGYN CLINIC | Facility: MEDICAL CENTER | Age: 62
End: 2022-07-18
Payer: COMMERCIAL

## 2022-07-18 VITALS
HEART RATE: 93 BPM | BODY MASS INDEX: 28.14 KG/M2 | SYSTOLIC BLOOD PRESSURE: 126 MMHG | DIASTOLIC BLOOD PRESSURE: 85 MMHG | WEIGHT: 190 LBS | HEIGHT: 69 IN

## 2022-07-18 DIAGNOSIS — M47.816 LUMBAR FACET ARTHROPATHY: ICD-10-CM

## 2022-07-18 DIAGNOSIS — M54.50 ACUTE LEFT-SIDED LOW BACK PAIN WITHOUT SCIATICA: ICD-10-CM

## 2022-07-18 DIAGNOSIS — R29.898 WEAKNESS OF LEFT HIP: ICD-10-CM

## 2022-07-18 DIAGNOSIS — M51.26 HERNIATED LUMBAR INTERVERTEBRAL DISC: ICD-10-CM

## 2022-07-18 DIAGNOSIS — M54.50 ACUTE LEFT-SIDED LOW BACK PAIN WITHOUT SCIATICA: Primary | ICD-10-CM

## 2022-07-18 PROCEDURE — 97110 THERAPEUTIC EXERCISES: CPT | Performed by: PHYSICAL THERAPIST

## 2022-07-18 PROCEDURE — 97161 PT EVAL LOW COMPLEX 20 MIN: CPT | Performed by: PHYSICAL THERAPIST

## 2022-07-18 PROCEDURE — 99214 OFFICE O/P EST MOD 30 MIN: CPT | Performed by: EMERGENCY MEDICINE

## 2022-07-18 NOTE — PROGRESS NOTES
Assessment/Plan:    Diagnoses and all orders for this visit:    Acute left-sided low back pain without sciatica  -     Ambulatory Referral to Pain Management; Future    Herniated lumbar intervertebral disc  -     Ambulatory Referral to Pain Management; Future    Weakness of left hip    Lumbar facet arthropathy  -     Ambulatory Referral to Pain Management; Future    Patient declines oral steroids at this time  She may restart Mobic, and continue PT  Referred to Pain Management for further treatment  Reviewed MRI Images and results     Return if symptoms worsen or fail to improve  Chief Complaint:     Chief Complaint   Patient presents with    Lower Back - Pain       Subjective:   Patient ID: Zainab Austin is a 63 y o  female  Patient returns to review MRI of the lumbar spine she does note some improvement of her symptoms (pain of the knee and thigh as well as left lower back) however she notes sensation of weakness of the left leg going up stairs  She is not currently taking meloxicam   She has participated in formal therapy    Initial note:  NP presents referred by Dr John Duarte for left lower back and thigh pain x 3 weeks  Symptoms are worse with prolonged sitting especially during car rides  She has been working with her chiropractor  Started taking Mobic      Review of Systems    The following portions of the patient's chart were reviewed and updated as appropriate:    Allergy:    Allergies   Allergen Reactions    Ampicillin Hives         Past Medical History:   Diagnosis Date    Hypertension     Sleep apnea        Past Surgical History:   Procedure Laterality Date    ADENOIDECTOMY      BUNIONECTOMY      NASAL SEPTUM SURGERY      RHINOPLASTY      TONSILLECTOMY         Social History     Socioeconomic History    Marital status: /Civil Union     Spouse name: Not on file    Number of children: Not on file    Years of education: Not on file    Highest education level: Not on file   Occupational History    Not on file   Tobacco Use    Smoking status: Former Smoker     Types: Cigarettes     Quit date: 2012     Years since quitting: 10 5    Smokeless tobacco: Never Used   Vaping Use    Vaping Use: Never used   Substance and Sexual Activity    Alcohol use: Yes     Comment: socially    Drug use: No    Sexual activity: Not on file   Other Topics Concern    Not on file   Social History Narrative    Consumes 1 cup of coffee per day     Social Determinants of Health     Financial Resource Strain: Not on file   Food Insecurity: Not on file   Transportation Needs: Not on file   Physical Activity: Not on file   Stress: Not on file   Social Connections: Not on file   Intimate Partner Violence: Not on file   Housing Stability: Not on file       Family History   Problem Relation Age of Onset    Diabetes Mother     Hypertension Mother     Lung cancer Father     Cancer Neg Hx     Heart disease Neg Hx     Thyroid disease Neg Hx     Stroke Neg Hx        Medications:    Current Outpatient Medications:     candesartan (ATACAND) 16 mg tablet, take 1 tablet by mouth once daily, Disp: 30 tablet, Rfl: 5    Cholecalciferol 125 MCG (5000 UT) capsule, Take 1 capsule by mouth daily , Disp: , Rfl:     cyclobenzaprine (FLEXERIL) 10 mg tablet, Take 1 tablet (10 mg total) by mouth daily as needed for muscle spasms, Disp: 30 tablet, Rfl: 0    escitalopram (LEXAPRO) 10 mg tablet, take 1 tablet by mouth once daily (Patient taking differently: 10 mg Takes 5 mg daily), Disp: 30 tablet, Rfl: 5    meloxicam (Mobic) 7 5 mg tablet, Take 1 tablet (7 5 mg total) by mouth daily, Disp: 30 tablet, Rfl: 0    Omega-3 Fatty Acids (FISH OIL PO), Take 2 g by mouth daily , Disp: , Rfl:     topiramate (TOPAMAX) 25 mg tablet, Take 25 mg by mouth 2 (two) times a day, Disp: , Rfl:     tretinoin (REFISSA) 0 05 % cream, APPLY A PEA SIZED AMOUNT TO ENTIRE FACE  EVERY NIGHT AT BEDTIME  ONE HOUR AFTER WASHING FACE, Disp: , Rfl:     tretinoin (RETIN-A) 0 025 % cream, APPLY TO FACE EVERY NIGHT AT BEDTIME TO TREAT AND PREVENT ACNE, Disp: , Rfl: 3    Livalo 4 MG TABS, Take 1 tablet by mouth daily (Patient not taking: No sig reported), Disp: , Rfl:     Patient Active Problem List   Diagnosis    Obesity, Class I, BMI 30-34 9    Abnormal weight gain    Sleep apnea    Hypertension    Serum calcium elevated    Vitamin D deficiency    Impaired fasting glucose       Objective:  /85   Pulse 93   Ht 5' 9" (1 753 m)   Wt 86 2 kg (190 lb)   BMI 28 06 kg/m²     Right Hip Exam     Muscle Strength   Flexion: 5/5       Left Hip Exam     Muscle Strength   Flexion: 4/5       Back Exam     Muscle Strength   Left Quadriceps:  5/5             Physical Exam      Neurologic Exam     Motor Exam     Strength   Left quadriceps: 5/5      Procedures    I have personally reviewed the written report of the pertinent studies  LUMBAR DISC SPACES:     L1-L2:  Normal      L2-L3:  There is a small annular fissure within the right foraminal portion of the disc, see series 5 image 13  No disc herniation, canal stenosis or foraminal nerve impingement      L3-L4:  There is a focal left foraminal/extraforaminal disc extrusion seen on axial imaging which is slightly hyperintense T2 disc on T2-weighted imaging  See series 5 images 16 and 17 and series 6, images 21 through 23  This disc extrusion abuts and   posteriorly displaces the dorsal root ganglia and exiting nerve  Correlate for left L3 radiculopathy      No canal stenosis or right foraminal narrowing      L4-L5:  There is an annular fissure within the left foraminal portion of the disc  Tiny left foraminal disc protrusion    No significant canal stenosis or foraminal nerve impingement      L5-S1:  Normal disc height and signal   Minor facet degenerative change      IMPRESSION:     Moderate focal left foraminal /extraforaminal disc extrusion at the L3-4 level with compression and displacement of the exiting nerve  Correlate for left L3 radiculopathy      Mild noncompressive degenerative change L2-3, L4-5 and L5-S1          X-rays lumbar spine reveal normal curvature and alignment there disc spaces and vertebral heights are preserved there is degenerative changes of the L5-S1 facets

## 2022-07-18 NOTE — PROGRESS NOTES
PT Evaluation     Today's date: 2022  Patient name: Poonam Mi  : 1960  MRN: [id number]  Referring provider: Annie Espinoza MD  Dx:   Encounter Diagnosis     ICD-10-CM    1  Lumbar facet arthropathy  M47 816 Ambulatory Referral to Physical Therapy   2  Acute left-sided low back pain without sciatica  M54 50 Ambulatory Referral to Physical Therapy                  Assessment  Assessment details: Poonam Mi is a 63 y o  female referred to outpt PT with dx of lumbar radiculopathy  Pt presents with directional preference of peripheralization of concordant sx's into L3 with lumbar flexion, centralization into extension, diminished hip flexion strength on left, and diminished L3 dermatomal sensation to light touch  Pt funct is limited with decrease in tolerance to standing any length of time and prolonged sitting while at work  Pt would benefit from skilled PT to address these limitations and max funct  Impairments: abnormal or restricted ROM, lacks appropriate home exercise program and pain with function     Prognosis: good    Goals  Funct 1  No restrictions with standing at work x4 weeks  2  No restrictions of sitting at work x4 weeks  Impairment 1  Decrease pain by 25 % x4 weeks  2  Increase strength by 1/2 grade x4 weeks  Plan  Patient would benefit from: skilled physical therapy  Planned therapy interventions: joint mobilization, manual therapy, neuromuscular re-education, strengthening, stretching, home exercise program and therapeutic exercise  Frequency: 2x week  Duration in weeks: 4        Subjective Evaluation    History of Present Illness  Mechanism of injury: Pt presents to PT with LBP and left leg pain after helping to lift her 25# dog into the car for a few days  Pt notes that she initially was having severe pain with difficulty moving and performing any activity  Pt had x-ray performed by PCP and was referred to ortho where she underwent an MRI    Pt is scheduled to f/u with ortho regarding MRI after today's PT visit  Pt notes that she has been seeing chiropractic for pain relief in which she has been treated with traction and relief of sx's, however left leg cont to be present  Pt notes increase in left leg and LBP occurring with sitting for prolonged periods especially at work and standing for any length of time  Pt walks frequently for exercise in which she is able to perform without limitations on distance or time due to LBP and left leg pain  Pt is currently taking no medication OTC or prescription for pain  Pt scores a 61 on her FOTO index  Pain  Current pain ratin  At best pain ratin  At worst pain ratin  Quality: radiating      Diagnostic Tests  MRI studies: abnormal  Patient Goals  Patient goals for therapy: independence with ADLs/IADLs and increased motion          Objective     Neurological Testing     Sensation     Lumbar   Left   Diminished: light touch    Right   Intact: light touch    Comments   Left light touch: At L lateral knee    Active Range of Motion     Lumbar   Flexion:  WFL and with pain  Extension:  Restriction level: moderate  Left lateral flexion:  WFL  Right lateral flexion:  WFL  Left rotation:  WFL  Right rotation:  [facility]    Additional Active Range of Motion Details  Pt is mod TTP present at L3-4  Pt has increase in L3 dermatomal sx's with lumbar flexion, centralizes with extension and no change in sx's with bilat SB  Strength/Myotome Testing     Left Hip   Planes of Motion   Flexion: 4+    Right Hip   Planes of Motion   Flexion: 5    Left Knee   Flexion: 5  Extension: 5    Right Knee   Flexion: 5  Extension: 5    Left Ankle/Foot   Dorsiflexion: 5  Plantar flexion: 5  Great toe extension: 5    Right Ankle/Foot   Dorsiflexion: 5  Plantar flexion: 5  Great toe extension: 5    Tests     Additional Tests Details  Pt has mild restriction of joint play at L3-4 with some reproducible sx's into left LE L3 dermatome           Precautions: None Manual  7/18/22        CPA/L UPA grade II                                                                           Neuro Re-Ed                                                                                                  Therex            Standing extension 5"x10           YANET ROM 5"x10            YANET sustained 1'x1                                                                                                 Gait Training                                 Modalities

## 2022-07-26 ENCOUNTER — APPOINTMENT (OUTPATIENT)
Dept: PHYSICAL THERAPY | Facility: REHABILITATION | Age: 62
End: 2022-07-26
Payer: COMMERCIAL

## 2022-07-29 ENCOUNTER — OFFICE VISIT (OUTPATIENT)
Dept: PHYSICAL THERAPY | Facility: REHABILITATION | Age: 62
End: 2022-07-29
Payer: COMMERCIAL

## 2022-07-29 DIAGNOSIS — M54.50 ACUTE LEFT-SIDED LOW BACK PAIN WITHOUT SCIATICA: ICD-10-CM

## 2022-07-29 DIAGNOSIS — M47.816 LUMBAR FACET ARTHROPATHY: Primary | ICD-10-CM

## 2022-07-29 PROCEDURE — 97140 MANUAL THERAPY 1/> REGIONS: CPT | Performed by: PHYSICAL THERAPIST

## 2022-07-29 PROCEDURE — 97110 THERAPEUTIC EXERCISES: CPT | Performed by: PHYSICAL THERAPIST

## 2022-07-29 NOTE — PROGRESS NOTES
Daily Note     Today's date: 2022  Patient name: Cori Caputo  : 1960  MRN: [id number]  Referring provider: Jimmy Walter MD  Dx:   Encounter Diagnosis     ICD-10-CM    1  Lumbar facet arthropathy  M47 816    2  Acute left-sided low back pain without sciatica  M54 50                   Subjective: Pt arrives to PT noting that she has pain management referral for October and was suggested that she has surgeon appt to discuss possible non-invasive laser surgery  Pt notes overall she is having min to no pain and presents with sx's centrally  Pt hasn't had leg pain over the last 2 weeks since prior PT and chiropractic appts  Objective: See treatment diary below    Precautions: None        Manual  22          CPA/L UPA grade II    30 mins                                                                        Neuro Re-Ed                                                                                                                             Therex             Standing extension 5"x10             YANET ROM 5"x10            YANET sustained 1'x1                                                                                                 Gait Training                                 Modalities                                                                Assessment: Pt demo's good lumbar mobility throughout without reproducible leg or LBP  Pt cont to note controlling LE sx's with extension based program and feels she has been able to progress her funct activities towards goals and prior to onset of sx's  Pt does vocalize concerns about sx's reoccurring in the future and discussed treatment goals for initial control of LE sx's and returning to active levels of funct with possible progression to strengthening as needed for core strength          Plan: Cont PT per POC

## 2022-08-09 ENCOUNTER — OFFICE VISIT (OUTPATIENT)
Dept: PHYSICAL THERAPY | Facility: REHABILITATION | Age: 62
End: 2022-08-09
Payer: COMMERCIAL

## 2022-08-09 DIAGNOSIS — M47.816 LUMBAR FACET ARTHROPATHY: Primary | ICD-10-CM

## 2022-08-09 DIAGNOSIS — M54.50 ACUTE LEFT-SIDED LOW BACK PAIN WITHOUT SCIATICA: ICD-10-CM

## 2022-08-09 PROCEDURE — 97140 MANUAL THERAPY 1/> REGIONS: CPT | Performed by: PHYSICAL THERAPIST

## 2022-08-09 PROCEDURE — 97112 NEUROMUSCULAR REEDUCATION: CPT | Performed by: PHYSICAL THERAPIST

## 2022-08-09 NOTE — PROGRESS NOTES
Daily Note     Today's date: 2022  Patient name: Francia He  : 1960  MRN: [id number]  Referring provider: Elyse Maddox MD  Dx:   Encounter Diagnosis     ICD-10-CM    1  Lumbar facet arthropathy  M47 816    2  Acute left-sided low back pain without sciatica  M54 50                   Subjective: Pt notes overall improvement in sx's and doing well, however this weekend she volunteered at local animal shelter and denies pain during activity, but woke up the following morning with LBP  Pt notes performing her HEP and her sx's subsided, however concerned that she cont to have sx's  Objective: See treatment diary below    Precautions: None        Manual  22        CPA/L UPA grade II    30 mins   10 mins         MWM YANET     3x10                                                        Neuro Re-Ed              hip ext alt LE     3"2x10        supine TrA     5"2x10        TrA to bridge     2x10                                                                              Therex             Standing extension 5"x10             YANET ROM 5"x10            YANET sustained 1'x1                                                                                                 Gait Training                                 Modalities                                                                Assessment: Pt encouraged to cont with HEP as issued to assist with her sx's, which she was perfomring well and independently  Pt has improvement in lumbar mobility throughout with less pain  Added core and glute strengthening at today's visit to assist with support and educated pt to perform TrA in multiple positions and throughout her day for improved motor control  Plan: Cont PT per POC        Access Code: [email or web address]  URL: [email or web address]  Date: 2022  Prepared by: Mely Duke    Program Notes   Francia He    Exercises  · Supine Transversus Abdominis Bracing - Hands on Ground - 2-3 x daily - 1 sets - 10 reps - 5 hold  · Supine Bridge - 1 x daily - 2 sets - 10 reps - 5 hold  · Prone Hip Extension with Plantarflexion - 1 x daily - 2 sets - 10 reps - 5 hold

## 2022-08-16 ENCOUNTER — APPOINTMENT (OUTPATIENT)
Dept: PHYSICAL THERAPY | Facility: REHABILITATION | Age: 62
End: 2022-08-16
Payer: COMMERCIAL

## 2022-08-18 ENCOUNTER — OFFICE VISIT (OUTPATIENT)
Dept: PHYSICAL THERAPY | Facility: REHABILITATION | Age: 62
End: 2022-08-18
Payer: COMMERCIAL

## 2022-08-18 DIAGNOSIS — M47.816 LUMBAR FACET ARTHROPATHY: Primary | ICD-10-CM

## 2022-08-18 DIAGNOSIS — M54.50 ACUTE LEFT-SIDED LOW BACK PAIN WITHOUT SCIATICA: ICD-10-CM

## 2022-08-18 PROCEDURE — 97140 MANUAL THERAPY 1/> REGIONS: CPT | Performed by: PHYSICAL THERAPIST

## 2022-08-18 PROCEDURE — 97112 NEUROMUSCULAR REEDUCATION: CPT | Performed by: PHYSICAL THERAPIST

## 2022-08-18 NOTE — PROGRESS NOTES
Daily Note     Today's date: 2022  Patient name: Franchesca Fraga  : 1960  MRN: [id number]  Referring provider: Fernando Rodriguez MD  Dx:   Encounter Diagnosis     ICD-10-CM    1  Lumbar facet arthropathy  M47 816    2  Acute left-sided low back pain without sciatica  M54 50                   Subjective: Pt denies pain, noting overall she is having min sx's in LB and denies all leg pain for the last 2 weeks  Objective: See treatment diary below    Precautions: None        Manual  22      CPA/L UPA grade II    30 mins   10 mins   10 mins       MWM YANET     3x10   3x10                                                      Neuro Re-Ed              hip ext alt LE     3"2x10  alt UE/LE x10 ea      supine TrA     5"2x10        TrA to bridge     2x10   5"x10 +abd at top      deadlift into glute contraction       5# x10                                                              Therex             Standing extension 5"x10             YANET ROM 5"x10            YANET sustained 1'x1                                                                                                 Gait Training                                 Modalities                                                                Assessment: Pt has good mobility present throughout lumbar spine as felt through mobs  Pt demo's good understanding of HEP and added progressions as appropriate today to assist with core strengthening program   Performed funct dead lift maneuver in standing to assist with bending control and glute contraction to assist with lumbar extension  Pt issued updatd HEP  Plan: Will f/u in 2 weeks with RE at that time  Access Code: [email or web address]  URL: [email or web address]  Date: 2022  Prepared by: Ray Peterson    Program Notes   Franchesca Fraga    Exercises  · Prone Alternating Arm and Leg Lifts - 4 x weekly - 2 sets - 10 reps - 5 hold  · Bridge with Hip Abduction and Resistance - 4 x weekly - 2 sets - 10 reps - 5 hold  · Half Deadlift with Kettlebell - 4 x weekly - 2 sets - 10 reps - 5 hold

## 2022-09-01 ENCOUNTER — OFFICE VISIT (OUTPATIENT)
Dept: PHYSICAL THERAPY | Facility: REHABILITATION | Age: 62
End: 2022-09-01
Payer: COMMERCIAL

## 2022-09-01 DIAGNOSIS — M47.816 LUMBAR FACET ARTHROPATHY: Primary | ICD-10-CM

## 2022-09-01 DIAGNOSIS — M54.50 ACUTE LEFT-SIDED LOW BACK PAIN WITHOUT SCIATICA: ICD-10-CM

## 2022-09-01 PROCEDURE — 97140 MANUAL THERAPY 1/> REGIONS: CPT | Performed by: PHYSICAL THERAPIST

## 2022-09-01 NOTE — PROGRESS NOTES
Daily Note/Discharge    Today's date: 2022  Patient name: Ines Carter  : 1960  MRN: [id number]  Referring provider: Scarlet Lala MD  Dx:   Encounter Diagnosis     ICD-10-CM    1  Lumbar facet arthropathy  M47 816    2  Acute left-sided low back pain without sciatica  M54 50                   Subjective: Pt cont to note that she is "doing very well "  Pt is having no pain currently and only mild achyness at times  Pt is able to perform HEP with good tolerance and no pain  Objective: See treatment diary below    Precautions: None        Manual  22    CPA/L UPA grade II    30 mins   10 mins   10 mins   15 mins     MWM YANET     3x10   3x10   3x10     RE         15 mins                                      Neuro Re-Ed              hip ext alt LE     3"2x10  alt UE/LE x10 ea  reviewed    supine TrA     5"2x10    reviewed    TrA to bridge     2x10   5"x10 +abd at top  reviewed    deadlift into glute contraction       5# x10   reviewed                                                           Therex             Standing extension 5"x10             YANET ROM 5"x10            YANET sustained 1'x1                                                                                                 Gait Training                                 Modalities                                                                Assessment: Pt demo's normal trunk ROM in all directions without reproducible LBP or left LE pain  Normal bilat LE MMT/myotomal testing  Good lumbar joint play throughout without reproducible sx's  Pt notes that she is no longer restricted herself with funct tasks at home due to not having pain  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

## 2022-10-10 ENCOUNTER — VBI (OUTPATIENT)
Dept: ADMINISTRATIVE | Facility: OTHER | Age: 62
End: 2022-10-10

## 2022-10-10 NOTE — TELEPHONE ENCOUNTER
10/10/22 11:55 AM     See documentation in the [facility] SmartForm       Haley Tad

## 2022-11-21 ENCOUNTER — OFFICE VISIT (OUTPATIENT)
Dept: FAMILY MEDICINE CLINIC | Facility: CLINIC | Age: 62
End: 2022-11-21

## 2022-11-21 VITALS
OXYGEN SATURATION: 99 % | DIASTOLIC BLOOD PRESSURE: 72 MMHG | RESPIRATION RATE: 16 BRPM | BODY MASS INDEX: 28.73 KG/M2 | WEIGHT: 194 LBS | SYSTOLIC BLOOD PRESSURE: 112 MMHG | HEART RATE: 70 BPM | HEIGHT: 69 IN | TEMPERATURE: 98.7 F

## 2022-11-21 DIAGNOSIS — J01.41 ACUTE RECURRENT PANSINUSITIS: ICD-10-CM

## 2022-11-21 DIAGNOSIS — R05.1 ACUTE COUGH: Primary | ICD-10-CM

## 2022-11-21 RX ORDER — AZITHROMYCIN 250 MG/1
TABLET, FILM COATED ORAL
Qty: 6 TABLET | Refills: 0 | Status: SHIPPED | OUTPATIENT
Start: 2022-11-21 | End: 2022-11-25

## 2022-11-21 NOTE — LETTER
November 21, 2022     Patient: Marek Franco  YOB: 1960  Date of Visit: 11/21/2022      To Whom it May Concern:    Marek Franco is under my professional care  Rell Frazier was seen in my office on 11/21/2022  Rell Frazier may return to work on Monday 11/28  May work from home  If you have any questions or concerns, please don't hesitate to call           Sincerely,          Renald Goltz, DO

## 2022-11-21 NOTE — PROGRESS NOTES
Name: Agnes Ramos      : 1960      MRN: [id number]  Encounter Provider: Ivan Meyers DO  Encounter Date: 2022   Encounter department: [facility] PRIMARY CARE    Assessment & Plan     1  Acute cough  -     azithromycin (ZITHROMAX) 250 mg tablet; Take 2 tablets today then 1 tablet daily x 4 days    2  Acute recurrent pansinusitis  -     azithromycin (ZITHROMAX) 250 mg tablet; Take 2 tablets today then 1 tablet daily x 4 days        hydrate hydrate hydrate, can use over-the-counter mucolytic expectorant  Subjective     Patient is seen today for evaluation of ongoing cough for about 2 weeks  Cough  This is a new problem  The current episode started 1 to 4 weeks ago  The problem has been waxing and waning  The problem occurs every few hours  The cough is non-productive (Most to the time nonproductive but feels like she is having are time trying to bring it up)  Associated symptoms include postnasal drip and rhinorrhea  Pertinent negatives include no fever or shortness of breath  Wheezing: Plus-minus  patient has remote history of sleep apnea  She denies any history of asthma COPD  She does not have an inhaler  I have not been sick in awhile  Chief Complaint   Patient presents with   • Cough     Pt is here for cough times 2 weeks and post nasal drip  Pt did not take home Covid test      Review of Systems   Constitutional: Negative for fever  Patient has constant exposure to individuals who are sick work  Employed at [address] [place] [address] [employer]: Positive for postnasal drip and rhinorrhea  Respiratory: Positive for cough (at night)  Negative for shortness of breath  Wheezing: Plus-minus  Gastrointestinal: Negative          Past Medical History:   Diagnosis Date   • Hypertension    • Sleep apnea      Past Surgical History:   Procedure Laterality Date   • ADENOIDECTOMY     • BUNIONECTOMY     • NASAL SEPTUM SURGERY     • RHINOPLASTY     • TONSILLECTOMY       Family History   Problem Relation Age of Onset   • Diabetes Mother    • Hypertension Mother    • Lung cancer Father    • Cancer Neg Hx    • Heart disease Neg Hx    • Thyroid disease Neg Hx    • Stroke Neg Hx      Social History     Socioeconomic History   • Marital status: /Civil Union     Spouse name: None   • Number of children: None   • Years of education: None   • Highest education level: None   Occupational History   • None   Tobacco Use   • Smoking status: Former     Types: Cigarettes     Quit date: 2012     Years since quitting: 10 9   • Smokeless tobacco: Never   Vaping Use   • Vaping Use: Never used   Substance and Sexual Activity   • Alcohol use: Yes     Comment: socially   • Drug use: No   • Sexual activity: None   Other Topics Concern   • None   Social History Narrative    Consumes 1 cup of coffee per day     Social Determinants of Health     Financial Resource Strain: Not on file   Food Insecurity: Not on file   Transportation Needs: Not on file   Physical Activity: Not on file   Stress: Not on file   Social Connections: Not on file   Intimate Partner Violence: Not on file   Housing Stability: Not on file     Current Outpatient Medications on File Prior to Visit   Medication Sig   • candesartan (ATACAND) 16 mg tablet take 1 tablet by mouth once daily   • Cholecalciferol 125 MCG (5000 UT) capsule Take 1 capsule by mouth daily    • Omega-3 Fatty Acids (FISH OIL PO) Take 2 g by mouth daily    • topiramate (TOPAMAX) 25 mg tablet Take 25 mg by mouth 2 (two) times a day   • tretinoin (REFISSA) 0 05 % cream APPLY A PEA SIZED AMOUNT TO ENTIRE FACE  EVERY NIGHT AT BEDTIME  ONE HOUR AFTER WASHING FACE   • tretinoin (RETIN-A) 0 025 % cream APPLY TO FACE EVERY NIGHT AT BEDTIME TO TREAT AND PREVENT ACNE   • cyclobenzaprine (FLEXERIL) 10 mg tablet Take 1 tablet (10 mg total) by mouth daily as needed for muscle spasms (Patient not taking: Reported on 11/21/2022)   • escitalopram (LEXAPRO) 10 mg tablet take 1 tablet by mouth once daily (Patient not taking: Reported on 11/21/2022)   • Livalo 4 MG TABS Take 1 tablet by mouth daily (Patient not taking: Reported on 6/3/2022)   • meloxicam (Mobic) 7 5 mg tablet Take 1 tablet (7 5 mg total) by mouth daily (Patient not taking: Reported on 11/21/2022)     Allergies   Allergen Reactions   • Ampicillin Hives     Immunization History   Administered Date(s) Administered   • COVID-19 PFIZER VACCINE 0 3 ML IM 03/04/2021, 03/25/2021, 12/06/2021   • INFLUENZA 10/27/2015, 10/19/2016, 10/23/2017   • Influenza, recombinant, quadrivalent,injectable, preservative free 10/22/2019   • Influenza, seasonal, injectable 01/10/2014   • Tdap 02/01/2018       Objective     /72   Pulse 70   Temp 98 7 °F (37 1 °C) (Temporal)   Resp 16   Ht 5' 8 5" (1 74 m)   Wt 88 kg (194 lb)   SpO2 99%   BMI 29 07 kg/m²     Physical Exam  Constitutional:       General: She is not in acute distress  Appearance: Normal appearance  She is well-developed and well-nourished  She is not toxic-appearing  HENT:      Head: Normocephalic  Right Ear: Tympanic membrane is retracted  Left Ear: Tympanic membrane is retracted  Nose: Mucosal edema and rhinorrhea present  Mouth/Throat:      Pharynx: Oropharynx is clear  Posterior oropharyngeal erythema: post nasal drip  Eyes:      Extraocular Movements: EOM normal       Conjunctiva/sclera: Conjunctivae normal       Pupils: Pupils are equal, round, and reactive to light  Cardiovascular:      Rate and Rhythm: Normal rate and regular rhythm  Heart sounds: No murmur heard  Pulmonary:      Effort: Pulmonary effort is normal  No respiratory distress  Breath sounds: Normal breath sounds  Comments: Prolonged expiratory phase  Musculoskeletal:         General: No edema  Lymphadenopathy:      Cervical: No cervical adenopathy  Skin:     General: Skin is warm and dry     Neurological:      Mental Status: She is alert and oriented to person, place, and time  Deep Tendon Reflexes: Reflexes are normal and symmetric  Psychiatric:         Mood and Affect: Mood and affect normal          Behavior: Behavior normal          Thought Content: Thought content normal        Jeramy العلي DO   [facility] PRIMARY [facility]  [address]  [address]  [facility] [address]  Phone#  [phone]  Fax#  [phone]        November 21, 2022      Patient:            John Rosenberg  YOB: 1960  Date of Visit:    11/21/2022        To Whom it May Concern:     John Rosenberg is under my professional care  Carrie Galvez was seen in my office on 11/21/2022  Carrie Galvez may return to work on Monday 11/28  May work from home      If you have any questions or concerns, please don't hesitate to call        Sincerely,   Jeramy العلي DO

## 2023-01-15 DIAGNOSIS — I10 ESSENTIAL HYPERTENSION: ICD-10-CM

## 2023-01-16 RX ORDER — CANDESARTAN 16 MG/1
TABLET ORAL
Qty: 30 TABLET | Refills: 5 | Status: SHIPPED | OUTPATIENT
Start: 2023-01-16

## 2023-06-13 ENCOUNTER — TELEPHONE (OUTPATIENT)
Dept: ADMINISTRATIVE | Facility: OTHER | Age: 63
End: 2023-06-13

## 2023-06-13 ENCOUNTER — APPOINTMENT (OUTPATIENT)
Dept: RADIOLOGY | Facility: MEDICAL CENTER | Age: 63
End: 2023-06-13
Payer: COMMERCIAL

## 2023-06-13 ENCOUNTER — OFFICE VISIT (OUTPATIENT)
Dept: FAMILY MEDICINE CLINIC | Facility: CLINIC | Age: 63
End: 2023-06-13
Payer: COMMERCIAL

## 2023-06-13 VITALS
SYSTOLIC BLOOD PRESSURE: 134 MMHG | TEMPERATURE: 98.3 F | RESPIRATION RATE: 16 BRPM | BODY MASS INDEX: 29.41 KG/M2 | HEART RATE: 89 BPM | HEIGHT: 69 IN | OXYGEN SATURATION: 97 % | WEIGHT: 198.6 LBS | DIASTOLIC BLOOD PRESSURE: 78 MMHG

## 2023-06-13 DIAGNOSIS — M25.462 PAIN AND SWELLING OF LEFT KNEE: ICD-10-CM
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
## 2023-06-13 DIAGNOSIS — M25.562 ACUTE PAIN OF LEFT KNEE: Primary | ICD-10-CM

## 2023-06-13 DIAGNOSIS — M25.562 PAIN AND SWELLING OF LEFT KNEE: ICD-10-CM

## 2023-06-13 DIAGNOSIS — M25.562 ACUTE PAIN OF LEFT KNEE: ICD-10-CM

## 2023-06-13 PROBLEM — D69.3 IDIOPATHIC THROMBOCYTOPENIC PURPURA (HCC): Status: ACTIVE | Noted: 2023-06-13

## 2023-06-13 PROCEDURE — 99213 OFFICE O/P EST LOW 20 MIN: CPT | Performed by: FAMILY MEDICINE

## 2023-06-13 PROCEDURE — 73564 X-RAY EXAM KNEE 4 OR MORE: CPT

## 2023-06-13 RX ORDER — TOPIRAMATE 50 MG/1
50 TABLET, FILM COATED ORAL 2 TIMES DAILY
COMMUNITY
Start: 2023-05-16

## 2023-06-13 NOTE — PROGRESS NOTES
Name: Christina Agudelo      : 1960      MRN: [id number]  Encounter Provider: Irlanda Alamo DO  Encounter Date: 2023   Encounter department: [address] [place] [address]  Chief Complaint   Patient presents with   • Fall     Pt had a fall 2 weeks ago since then has been having a lot pain leg knew  Pain scale 5/10      Patient Instructions    Fall (Pt had a fall 2 weeks ago since then has been having a lot pain leg knew  Pain scale 5/10 )  Patient to get xray left knee and consuult orthopedics for left lateral knee pain and swelling over last 2 weeks and is ambulatory with 5/10 pain  Patient work excuse 2 weeks and call if not better  May use Tylenol or advil prn pain with food  Assessment & Plan     1  Acute pain of left knee  -     XR knee 4+ vw left injury; Future; Expected date: 2023  -     Ambulatory Referral to Orthopedic Surgery; Future    2  Pain and swelling of left knee  -     XR knee 4+ vw left injury; Future; Expected date: 2023  -     Ambulatory Referral to Orthopedic Surgery; Future    3  Fall, initial encounter  -     XR knee 4+ vw left injury; Future; Expected date: 2023  -     Ambulatory Referral to Orthopedic Surgery; Future           Subjective      Fall (Pt had a fall 2 weeks ago since then has been having a lot pain leg knew  Pain scale 5/10 ) left knee hit wall after falling down stairs 2023  It is getting better  Went to urgent care  Saw Podiatry as well  Fall      Review of Systems   Constitutional: Negative  HENT: Negative  Eyes: Negative  Respiratory: Negative  Cardiovascular: Negative  Gastrointestinal: Negative  Endocrine: Negative  Genitourinary: Negative  Musculoskeletal:        Left knee pain and has swelling in lateral knee and left lower leg  Skin: Negative  Allergic/Immunologic: Negative  Neurological: Negative  Hematological: Negative  Psychiatric/Behavioral: Negative          Current "Outpatient Medications on File Prior to Visit   Medication Sig   • candesartan (ATACAND) 16 mg tablet take 1 tablet by mouth once daily   • Cholecalciferol 125 MCG (5000 UT) capsule Take 1 capsule by mouth daily    • Omega-3 Fatty Acids (FISH OIL PO) Take 2 g by mouth daily    • tretinoin (REFISSA) 0 05 % cream APPLY A PEA SIZED AMOUNT TO ENTIRE FACE  EVERY NIGHT AT BEDTIME  ONE HOUR AFTER WASHING FACE   • cyclobenzaprine (FLEXERIL) 10 mg tablet Take 1 tablet (10 mg total) by mouth daily as needed for muscle spasms (Patient not taking: Reported on 11/21/2022)   • escitalopram (LEXAPRO) 10 mg tablet take 1 tablet by mouth once daily (Patient not taking: Reported on 11/21/2022)   • Livalo 4 MG TABS Take 1 tablet by mouth daily (Patient not taking: Reported on 6/3/2022)   • meloxicam (Mobic) 7 5 mg tablet Take 1 tablet (7 5 mg total) by mouth daily (Patient not taking: Reported on 11/21/2022)   • topiramate (TOPAMAX) 25 mg tablet Take 25 mg by mouth 2 (two) times a day   • topiramate (TOPAMAX) 50 MG tablet Take 50 mg by mouth 2 (two) times a day (Patient not taking: Reported on 6/13/2023)   • tretinoin (RETIN-A) 0 025 % cream APPLY TO FACE EVERY NIGHT AT BEDTIME TO TREAT AND PREVENT ACNE       Objective     /78 (BP Location: Left arm, Patient Position: Sitting, Cuff Size: Adult)   Pulse 89   Temp 98 3 °F (36 8 °C) (Temporal)   Resp 16   Ht 5' 8 5\" (1 74 m)   Wt 90 1 kg (198 lb 9 6 oz)   SpO2 97%   BMI 29 76 kg/m²     Physical Exam  Constitutional:       Appearance: She is well-developed  HENT:      Head: Normocephalic and atraumatic  Eyes:      Conjunctiva/sclera: Conjunctivae normal       Pupils: Pupils are equal, round, and reactive to light  Cardiovascular:      Rate and Rhythm: Normal rate and regular rhythm  Pulses: Normal pulses  Heart sounds: Normal heart sounds  Pulmonary:      Effort: Pulmonary effort is normal       Breath sounds: Normal breath sounds     Musculoskeletal:         " General: Normal range of motion  Cervical back: Normal range of motion and neck supple  Skin:     General: Skin is warm and dry  Capillary Refill: Capillary refill takes less than 2 seconds  Neurological:      General: No focal deficit present  Mental Status: She is alert and oriented to person, place, and time  Mental status is at baseline  Deep Tendon Reflexes: Reflexes are normal and symmetric  Psychiatric:         Mood and Affect: Mood normal          Behavior: Behavior normal          Thought Content:  Thought content normal          Judgment: Judgment normal        Vandana Fritz DO

## 2023-06-13 NOTE — TELEPHONE ENCOUNTER
----- Message from Birdie Coy sent at 6/12/2023  1:55 PM EDT -----  Regarding: care gap quality update - Jami Thakur, our patient No patient name on file  has had Mammogram completed/performed  Please assist in updating the patient chart by pulling the Care Everywhere (CE) document  The date of service is 04/12/2023       Thank you,  Birdie Coy  [id number]

## 2023-06-13 NOTE — TELEPHONE ENCOUNTER
Upon review of the In Basket request we were able to locate, review, and update the patient chart as requested for Mammogram     Any additional questions or concerns should be emailed to the Practice Liaisons via the appropriate education email address, please do not reply via In Basket      Thank you  Nathan Meyer

## 2023-06-13 NOTE — PATIENT INSTRUCTIONS
Fall (Pt had a fall 2 weeks ago since then has been having a lot pain leg knew  Pain scale 5/10 )  Patient to get xray left knee and consuult orthopedics for left lateral knee pain and swelling over last 2 weeks and is ambulatory with 5/10 pain  Patient work excuse 2 weeks and call if not better  May use Tylenol or advil prn pain with food

## 2023-06-22 ENCOUNTER — OFFICE VISIT (OUTPATIENT)
Dept: OBGYN CLINIC | Facility: MEDICAL CENTER | Age: 63
End: 2023-06-22
Payer: COMMERCIAL

## 2023-06-22 VITALS
WEIGHT: 197.2 LBS | DIASTOLIC BLOOD PRESSURE: 85 MMHG | HEIGHT: 69 IN | BODY MASS INDEX: 29.21 KG/M2 | SYSTOLIC BLOOD PRESSURE: 127 MMHG | HEART RATE: 105 BPM

## 2023-06-22 DIAGNOSIS — M25.562 ACUTE PAIN OF LEFT KNEE: ICD-10-CM

## 2023-06-22 DIAGNOSIS — M25.562 PAIN AND SWELLING OF LEFT KNEE: ICD-10-CM

## 2023-06-22 DIAGNOSIS — M25.462 PAIN AND SWELLING OF LEFT KNEE: ICD-10-CM
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
## 2023-06-22 DIAGNOSIS — M23.92 INTERNAL DERANGEMENT OF LEFT KNEE: Primary | ICD-10-CM

## 2023-06-22 PROCEDURE — 99243 OFF/OP CNSLTJ NEW/EST LOW 30: CPT | Performed by: STUDENT IN AN ORGANIZED HEALTH CARE EDUCATION/TRAINING PROGRAM

## 2023-06-22 NOTE — PROGRESS NOTES
Assessment:   Diagnosis ICD-10-CM Associated Orders   1  Internal derangement of left knee  M23 92 Ambulatory Referral to Physical Therapy      2  Acute pain of left knee  M25 562 Ambulatory Referral to Orthopedic Surgery     Ambulatory Referral to Physical Therapy      3  Pain and swelling of left knee  M25 562 Ambulatory Referral to Orthopedic Surgery    M25 462 Ambulatory Referral to Physical Therapy      4  Fall, initial encounter  [address]  XXXA Ambulatory Referral to Orthopedic Surgery     Ambulatory Referral to Physical Therapy          Plan:  Reviewed today's physical exam findings and x-ray findings with patient at time of visit  X-Ray of left knee taken on 06/13/2023 were reviewed and showed no acute osseous abnormalities  The patient has likely sustained a contusion of the knee  Risks and benefits of conservative and operative treatments were discussed in detail with the patient  It was explained to the patient that based upon the clinical and radiographic findings, at this point in time, this is not a surgical problem  Treatment for the above diagnoses and associated symptoms of this nature is generally conservative including a physician directed physical therapy program, anti-inflammatories, and potentially various injections  Amb referral to physical therapy to improve ROM and strengthening of left knee  She can continue NSAIDs/Tylenol as needed for pain and soreness  If symptoms persist, she will be seen for follow-up in 2 months for re-evaluation and consider ordering an MRI of left knee wo contrast  Patient expresses understanding and is in agreement with this treatment plan  The patient was given the opportunity to ask questions or present concerns  To do next visit:  Return if symptoms worsen or fail to improve  The above stated was discussed in layman's terms and the patient expressed understanding  All questions were answered to the patient's satisfaction       Scribe Attestation    I,: Jeison Marinelli am acting as a scribe while in the presence of the attending physician :       I,:  Cherise Morris, DO personally performed the services described in this documentation    as scribed in my presence :         Subjective:   Zi Gallo is a 63 y o  female who presents today for an Initial evaluation of her left knee due to acute pain  The patient was last seen on 06/13/2023 at [address] [place] [address] in which x-rays were taken/reviewed and instructed the patient to follow-up with orthopedic surgery  On today's presentation, the patient states she was running out of her room and slipped on the stairs causing her to overextend her knee with a twisting mechanism outwards and caused the knee to hit a nearby wall on 06/01/2023  Today, the patient states she has increased pain with weightbearing activities and notes an electrical sensation on the lateral aspect of her knee  She is tender along the medial and lateral joint line as well as the Pes anserine bursa  She notes lower extremity swelling into her left ankle and foot  She currently has been resting and icing her left knee  She currently does not take any over-the-counter pain medication for her symptoms  She denies any previous injuries or surgical history to her left knee  She does state that she is currently working from home due to the injury  Occupation: Finance    Review of systems negative unless otherwise specified in HPI  Review of Systems   Constitutional: Negative for chills and fever  HENT: Negative for ear pain and sore throat  Eyes: Negative for pain and visual disturbance  Respiratory: Negative for cough and shortness of breath  Cardiovascular: Negative for chest pain and palpitations  Gastrointestinal: Negative for abdominal pain and vomiting  Genitourinary: Negative for dysuria and hematuria  Musculoskeletal: Negative for arthralgias and back pain  Skin: Negative for color change and rash  Neurological: Negative for seizures and syncope  All other systems reviewed and are negative        Past Medical History:   Diagnosis Date   • Hypertension    • Sleep apnea        Past Surgical History:   Procedure Laterality Date   • ADENOIDECTOMY     • BUNIONECTOMY     • NASAL SEPTUM SURGERY     • RHINOPLASTY     • TONSILLECTOMY         Family History   Problem Relation Age of Onset   • Diabetes Mother    • Hypertension Mother    • Lung cancer Father    • Cancer Neg Hx    • Heart disease Neg Hx    • Thyroid disease Neg Hx    • Stroke Neg Hx        Social History     Occupational History   • Not on file   Tobacco Use   • Smoking status: Former     Types: Cigarettes     Quit date:      Years since quittin 4   • Smokeless tobacco: Never   Vaping Use   • Vaping Use: Never used   Substance and Sexual Activity   • Alcohol use: Yes     Comment: socially   • Drug use: No   • Sexual activity: Not on file         Current Outpatient Medications:   •  candesartan (ATACAND) 16 mg tablet, take 1 tablet by mouth once daily, Disp: 30 tablet, Rfl: 5  •  Omega-3 Fatty Acids (FISH OIL PO), Take 2 g by mouth daily , Disp: , Rfl:   •  tretinoin (REFISSA) 0 05 % cream, APPLY A PEA SIZED AMOUNT TO ENTIRE FACE  EVERY NIGHT AT BEDTIME  ONE HOUR AFTER WASHING FACE, Disp: , Rfl:   •  Cholecalciferol 125 MCG (5000 UT) capsule, Take 1 capsule by mouth daily  (Patient not taking: Reported on 2023), Disp: , Rfl:   •  cyclobenzaprine (FLEXERIL) 10 mg tablet, Take 1 tablet (10 mg total) by mouth daily as needed for muscle spasms (Patient not taking: Reported on 2022), Disp: 30 tablet, Rfl: 0  •  escitalopram (LEXAPRO) 10 mg tablet, take 1 tablet by mouth once daily (Patient not taking: Reported on 2022), Disp: 30 tablet, Rfl: 5  •  Livalo 4 MG TABS, Take 1 tablet by mouth daily (Patient not taking: Reported on 6/3/2022), Disp: , Rfl:   •  meloxicam (Mobic) 7 5 mg tablet, Take 1 tablet (7 5 mg total) by mouth daily (Patient not taking: Reported on 11/21/2022), Disp: 30 tablet, Rfl: 0  •  topiramate (TOPAMAX) 25 mg tablet, Take 25 mg by mouth 2 (two) times a day, Disp: , Rfl:   •  topiramate (TOPAMAX) 50 MG tablet, Take 50 mg by mouth 2 (two) times a day (Patient not taking: Reported on 6/13/2023), Disp: , Rfl:   •  tretinoin (RETIN-A) 0 025 % cream, APPLY TO FACE EVERY NIGHT AT BEDTIME TO TREAT AND PREVENT ACNE, Disp: , Rfl: 3    Allergies   Allergen Reactions   • Ampicillin Hives          Vitals:    06/22/23 1611   BP: 127/85   Pulse: 105       Objective:                    Ortho Exam  left knee(s) -   Patient ambulates with antalgic gait pattern  Uses No assistive device  No anatomical deformity  Skin is warm and dry to touch with no signs of erythema, ecchymosis, or infection  Mild generalized soft tissue swelling or effusion noted  ROM (5° - 115°)  MMT: 4/5 throughout  TTP over medial joint line, TTP over lateral joint line, TTP over pes anserine bursa, TTP over distal IT band, with popliteal fullness appreciated on exam   Flexor and extensor mechanisms are intact   Knee is stable to varus and valgus stress  - Lachman's  - Anterior Drawer, - Posterior Drawer  - Medial Tee's, - Lateral Tee's  - Pivot Shift  Patella tracks centrally without palpable crepitus  Calf compartments are soft and supple  - Lacey's sign  2+ DP and PT pulses with brisk capillary refill to the toes  Sural, saphenous, tibial, superficial, and deep peroneal motor and sensory distributions intact  Sensation light touch intact distally    Diagnostics, reviewed and taken today if performed as documented: The attending physician has personally reviewed the pertinent films in PACS and interpretation is as follows:    X-Ray of left knee taken on 06/13/2023 were reviewed and showed no acute osseous abnormalities  Procedures, if performed today:    None performed    Portions of the record may have been created with voice recognition software  "Occasional wrong word or \"sound a like\" substitutions may have occurred due to the inherent limitations of voice recognition software  Read the chart carefully and recognize, using context, where substitutions have occurred    "

## 2023-07-06 ENCOUNTER — EVALUATION (OUTPATIENT)
Dept: PHYSICAL THERAPY | Facility: REHABILITATION | Age: 63
End: 2023-07-06
Payer: COMMERCIAL

## 2023-07-06 DIAGNOSIS — M23.92 INTERNAL DERANGEMENT OF LEFT KNEE: ICD-10-CM
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
## 2023-07-06 DIAGNOSIS — M25.462 PAIN AND SWELLING OF LEFT KNEE: ICD-10-CM

## 2023-07-06 DIAGNOSIS — M25.562 PAIN AND SWELLING OF LEFT KNEE: ICD-10-CM

## 2023-07-06 DIAGNOSIS — M25.562 ACUTE PAIN OF LEFT KNEE: Primary | ICD-10-CM

## 2023-07-06 PROCEDURE — 97161 PT EVAL LOW COMPLEX 20 MIN: CPT | Performed by: PHYSICAL THERAPIST

## 2023-07-06 NOTE — PROGRESS NOTES
PT Evaluation     Today's date: 2023  Patient name: vEelyn Fonseca  : 1960  MRN: [id number]  Referring provider: Linda Laureano DO  Dx:   Encounter Diagnosis     ICD-10-CM    1. Acute pain of left knee  M25.562 Ambulatory Referral to Physical Therapy      2. Pain and swelling of left knee  M25.562 Ambulatory Referral to Physical Therapy    M25.462       3. Fall, initial encounter  Extension Hermanas Figueredo. XXXA Ambulatory Referral to Physical Therapy      4. Internal derangement of left knee  M23.92 Ambulatory Referral to Physical Therapy                     Assessment  Assessment details: Evelyn Fonseca is a 63 y.o. female referred to outpt PT with dx of left knee contusion and pain s/p fall. Pt presents with decrease in left LE strength, positive pain along LCL and lateral joint space, and good stability and ROM testing. Pt funct is limited with decrease in tolerance to ambulation longer duration when walking with her dog, descending ramp and stairs, and feeling left leg is doing to buckle. Pt would benefit from skilled PT to address these limitations and max funct. Impairments: abnormal or restricted ROM, impaired physical strength, lacks appropriate home exercise program and pain with function     Prognosis: good    Goals  Funct 1. Normal amb without restriction x4 weeks. 2. Return to descending incline/stairs without restriction x4 weeks. Impairment 1. Increase strength by 1/2 grade x4 weeks. Plan  Patient would benefit from: skilled physical therapy  Planned therapy interventions: therapeutic exercise, strengthening and home exercise program  Frequency: 1x week  Duration in weeks: 4        Subjective Evaluation    History of Present Illness  Mechanism of injury: Pt had a slip and fall within her home with landing on left knee 23. Pt went to urgent care and evaluated her left knee and left foot. Pt was sent to [facility] to left foot edema and no fracture was found.   Pt had f/u with PCP regarding fall and was given x-ray for left knee pain with negative findings for fracture, does have mild narrowing of joint space. Pt was then seen by ortho in which he recommended PT at this time. Pt notes that funct she is able to walk for short durations without increase in pain, but does feel weakness occur with walking prolonged and feels as though it is "going to give out."  Pt had similar sx's feeling with descending hill. Pt notes that she has no pain with sitting, but pain upon rising from prolonged sitting with reports of stiffness. Stiffness improves upon movement and walking. Pt does reports edema present which was also there immediately post fall and has similar edema to left foot. Pt currently unable to knee left knee due to pain and sx's for cleaning purposes. Pt denies sleep disturbances. Pt is having some restrictions with stooping and bending activities. Pt's overall goal is to "get stronger to walk my dog."    Pain  Current pain ratin  At best pain ratin  At worst pain ratin      Diagnostic Tests  X-ray: normal  Patient Goals  Patient goals for therapy: decreased pain and independence with ADLs/IADLs          Objective     Active Range of Motion   Left Knee   Normal active range of motion    Passive Range of Motion   Left Knee   Normal passive range of motion    Additional Passive Range of Motion Details  Pt notes mild discomfort lateral knee with endrange passive flexion. Strength/Myotome Testing     Left Knee   Flexion: 5  Extension: 4+    Additional Strength Details  Pt demo's good quad contraction bilat and denies pain. Pt does have positive TTP along lateral joint space and LCL.      Tests     Left Knee   Negative anterior drawer, Apley's compression, Apley's distraction, bounce home, lateral Tee, medial Tee, patellar apprehension, posterior drawer, valgus stress test at 0 degrees, valgus stress test at 30 degrees, varus stress test at 0 degrees and varus stress test at 30 degrees. Additional Tests Details  Pt has good stability present with all testing as above. Pt does demo mild instability with SLS on left compared with right. Pt also demo's diminished quad control on left with stepping down but denies pain.              Precautions: None        Manual  7/6/23                                                                                   Neuro Re-Ed             SLR 3"x10         clamshells 5"x10           SLS 15"x3                                                               Therex                                                                                                                                      Gait Training                                 Modalities

## 2023-07-18 ENCOUNTER — TELEPHONE (OUTPATIENT)
Dept: FAMILY MEDICINE CLINIC | Facility: CLINIC | Age: 63
End: 2023-07-18

## 2023-07-18 ENCOUNTER — APPOINTMENT (OUTPATIENT)
Dept: RADIOLOGY | Facility: MEDICAL CENTER | Age: 63
End: 2023-07-18
Payer: COMMERCIAL
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
## 2023-07-18 DIAGNOSIS — M25.562 ACUTE PAIN OF LEFT KNEE: Primary | ICD-10-CM

## 2023-07-18 DIAGNOSIS — M25.562 ACUTE PAIN OF LEFT KNEE: ICD-10-CM

## 2023-07-18 PROCEDURE — 73564 X-RAY EXAM KNEE 4 OR MORE: CPT

## 2023-07-20 ENCOUNTER — APPOINTMENT (OUTPATIENT)
Dept: PHYSICAL THERAPY | Facility: REHABILITATION | Age: 63
End: 2023-07-20
Payer: COMMERCIAL

## 2023-07-21 ENCOUNTER — TELEPHONE (OUTPATIENT)
Dept: OBGYN CLINIC | Facility: MEDICAL CENTER | Age: 63
End: 2023-07-21

## 2023-07-21 DIAGNOSIS — I10 ESSENTIAL HYPERTENSION: ICD-10-CM

## 2023-07-21 RX ORDER — CANDESARTAN 16 MG/1
TABLET ORAL
Qty: 30 TABLET | Refills: 5 | Status: SHIPPED | OUTPATIENT
Start: 2023-07-21

## 2023-07-24 ENCOUNTER — OFFICE VISIT (OUTPATIENT)
Dept: OBGYN CLINIC | Facility: MEDICAL CENTER | Age: 63
End: 2023-07-24
Payer: COMMERCIAL

## 2023-07-24 VITALS
HEART RATE: 111 BPM | BODY MASS INDEX: 29.62 KG/M2 | HEIGHT: 69 IN | WEIGHT: 200 LBS | DIASTOLIC BLOOD PRESSURE: 81 MMHG | SYSTOLIC BLOOD PRESSURE: 125 MMHG

## 2023-07-24 DIAGNOSIS — M25.562 ACUTE PAIN OF LEFT KNEE: Primary | ICD-10-CM

## 2023-07-24 PROCEDURE — 99213 OFFICE O/P EST LOW 20 MIN: CPT | Performed by: ORTHOPAEDIC SURGERY

## 2023-07-24 NOTE — PROGRESS NOTES
Ortho Sports Medicine Knee New Patient Visit     Assesment:   63 y.o. female left knee pain, with concern for meniscus tear. Plan:    Conservative treatment:    · I explained to the patient her symptoms could be coming from a meniscus tear in her left knee. I recommended trying physical therapy first before doing a CSI or MRI. Patient agreed with the treatment plan. · Ice to knee for 20 minutes at least 1-2 times daily. · PT for ROM/strengthening to knee, hip and core. · OTC NSAIDS prn for pain. · Tylenol for pain. · Let pain guide gradual return activities. · Follow-up PRN if symptoms worsen for fail to improve. Imaging: All imaging from today was reviewed by myself and explained to the patient. Injection:    No Injection planned at this time. Surgery:     No surgery is recommended at this point, continue with conservative treatment plan as noted. Follow up:    Return if symptoms worsen or fail to improve. Chief Complaint   Patient presents with   • Left Knee - Pain       History of Present Illness: The patient is a 63 y.o. female who presents today for evaluation of left knee pain referred to me by Dr. Campbell Found for left knee pain that began acutely on 06/13/2023 with a fall and hit to the knee on the stairs with hyperextension, twist. She denies hearing nor feeling any popping sensation at that time. She reports she had a repeat fall on her left knee the re-aggrevated her symptoms when walking her dog near a creek in July 2023. She was seen by PT for an evaluation but referred to orthopedic surgery for clearance to continue PT. Her pain is located in the medial and lateral joint line. She reports her pain is dull and achy at a 5/10. She reports pain going down the stairs. She denies any popping nor clicking. She has tried ice and over the counter anti-inflammatories for pain.     Knee Surgical History:  None    Past Medical, Social and Family History:  Past Medical History:   Diagnosis Date   • Hypertension    • Sleep apnea      Past Surgical History:   Procedure Laterality Date   • ADENOIDECTOMY     • BUNIONECTOMY     • NASAL SEPTUM SURGERY     • RHINOPLASTY     • TONSILLECTOMY       Allergies   Allergen Reactions   • Ampicillin Hives     Current Outpatient Medications on File Prior to Visit   Medication Sig Dispense Refill   • candesartan (ATACAND) 16 mg tablet take 1 tablet by mouth once daily 30 tablet 5   • Omega-3 Fatty Acids (FISH OIL PO) Take 2 g by mouth daily      • tretinoin (REFISSA) 0.05 % cream APPLY A PEA SIZED AMOUNT TO ENTIRE FACE  EVERY NIGHT AT BEDTIME  ONE HOUR AFTER WASHING FACE     • Cholecalciferol 125 MCG (5000 UT) capsule Take 1 capsule by mouth daily  (Patient not taking: Reported on 6/22/2023)     • cyclobenzaprine (FLEXERIL) 10 mg tablet Take 1 tablet (10 mg total) by mouth daily as needed for muscle spasms (Patient not taking: Reported on 11/21/2022) 30 tablet 0   • escitalopram (LEXAPRO) 10 mg tablet take 1 tablet by mouth once daily (Patient not taking: Reported on 11/21/2022) 30 tablet 5   • Livalo 4 MG TABS Take 1 tablet by mouth daily (Patient not taking: Reported on 6/3/2022)     • meloxicam (Mobic) 7.5 mg tablet Take 1 tablet (7.5 mg total) by mouth daily (Patient not taking: Reported on 11/21/2022) 30 tablet 0   • topiramate (TOPAMAX) 25 mg tablet Take 25 mg by mouth 2 (two) times a day     • topiramate (TOPAMAX) 50 MG tablet Take 50 mg by mouth 2 (two) times a day (Patient not taking: Reported on 6/13/2023)     • tretinoin (RETIN-A) 0.025 % cream APPLY TO FACE EVERY NIGHT AT BEDTIME TO TREAT AND PREVENT ACNE  3     No current facility-administered medications on file prior to visit.      Social History     Socioeconomic History   • Marital status: /Civil Union     Spouse name: Not on file   • Number of children: Not on file   • Years of education: Not on file   • Highest education level: Not on file   Occupational History   • Not on file   Tobacco Use   • Smoking status: Former     Types: Cigarettes     Quit date:      Years since quittin.5   • Smokeless tobacco: Never   Vaping Use   • Vaping Use: Never used   Substance and Sexual Activity   • Alcohol use: Yes     Comment: socially   • Drug use: No   • Sexual activity: Not on file   Other Topics Concern   • Not on file   Social History Narrative    Consumes 1 cup of coffee per day     Social Determinants of Health     Financial Resource Strain: Not on file   Food Insecurity: Not on file   Transportation Needs: Not on file   Physical Activity: Not on file   Stress: Not on file   Social Connections: Not on file   Intimate Partner Violence: Not on file   Housing Stability: Not on file         I have reviewed the past medical, surgical, social and family history, medications and allergies as documented in the EMR. Review of systems: ROS is negative other than that noted in the HPI. Constitutional: Negative for fatigue and fever. HENT: Negative for sore throat. Respiratory: Negative for shortness of breath. Cardiovascular: Negative for chest pain. Gastrointestinal: Negative for abdominal pain. Endocrine: Negative for cold intolerance and heat intolerance. Genitourinary: Negative for flank pain. Musculoskeletal: Negative for back pain. Skin: Negative for rash. Allergic/Immunologic: Negative for immunocompromised state. Neurological: Negative for dizziness. Psychiatric/Behavioral: Negative for agitation. Physical Exam:    Blood pressure 125/81, pulse (!) 111, height 5' 8.5" (1.74 m), weight 90.7 kg (200 lb).     General/Constitutional: NAD, well developed, well nourished  HENT: Normocephalic, atraumatic  CV: Intact distal pulses, regular rate  Resp: No respiratory distress or labored breathing  GI: Soft and non-tender   Lymphatic: No lymphadenopathy palpated  Neuro: Alert and Oriented x 3, no focal deficits  Psych: Normal mood, normal affect, normal judgement, normal behavior  Skin: Warm, dry, no rashes, no erythema      Knee Exam (focused): RIGHT LEFT   ROM:   0-130 0-130   Palpation: Effusion negative negative     MJL tenderness Negative Positive     LJL tenderness Negative Positive   Meniscus: Tee Negative Negative    Apley's Compression Negative Negative   Instability: Varus stable stable     Valgus stable stable   Special Tests: Lachman Negative Negative     Posterior drawer Negative Negative     Anterior drawer Negative Negative     Pivot shift negative negative     Dial negative negative   Patella: Palpation no tenderness no tenderness     Mobility 1/4 1/4     Apprehension Negative Negative   Other: Single leg 1/4 squat not tested not tested      LE NV Exam: +2 DP/PT pulses bilaterally  Sensation intact to light touch L2-S1 bilaterally     Bilateral hip ROM demonstrates no pain actively or passively    No calf tenderness to palpation bilaterally    Knee Imaging    X-rays of the left knee 07/18/2023 were reviewed, which demonstrate small joint effusion, no fracture nor dislocation, no significant degenerative changes, small patellar enthesophyte. I have reviewed the radiology report and agree with their impression.       Scribe Attestation    I,:  Markie Peguero am acting as a scribe while in the presence of the attending physician.:       I,:  Ovidio Alejandre DO personally performed the services described in this documentation    as scribed in my presence.:

## 2023-08-07 ENCOUNTER — EVALUATION (OUTPATIENT)
Dept: PHYSICAL THERAPY | Facility: REHABILITATION | Age: 63
End: 2023-08-07
Payer: COMMERCIAL

## 2023-08-07 DIAGNOSIS — M25.562 ACUTE PAIN OF LEFT KNEE: Primary | ICD-10-CM

## 2023-08-07 PROCEDURE — 97140 MANUAL THERAPY 1/> REGIONS: CPT | Performed by: PHYSICAL THERAPIST

## 2023-08-07 PROCEDURE — 97110 THERAPEUTIC EXERCISES: CPT | Performed by: PHYSICAL THERAPIST

## 2023-08-07 PROCEDURE — 97164 PT RE-EVAL EST PLAN CARE: CPT | Performed by: PHYSICAL THERAPIST

## 2023-08-07 NOTE — PROGRESS NOTES
PT Re-Evaluation     Today's date: 2023  Patient name: Maycol Anne  : 1960  MRN: [id number]  Referring provider: Nora Kenyon DO  Dx:   Encounter Diagnosis     ICD-10-CM    1. Acute pain of left knee  M25.562 Ambulatory Referral to Physical Therapy                     Assessment  Assessment details:     Impairments: abnormal or restricted ROM, impaired physical strength, lacks appropriate home exercise program and pain with function  Barriers to therapy: Maycol Anne is a 63 y.o. female referred to outpt PT with dx of left knee pain s/p fall. Pt presents with decrease in left knee ROM, decrease in left LE strength, and positive edema. Pt funct is limited with decrease in tolerance to ambulation, negotiation of stairs, or standing activities especially after prolonged sitting. Pt would benefit from skilled PT to address these limitations and max funct. Prognosis: good    Goals  Funct 1. Normal amb without restriction x4 weeks. 2. Return to descending incline/stairs without restriction x4 weeks. Impairment 1. Increase strength by 1/2 grade x4 weeks. 2. Increase ROM by 25% x4 weeks  3. Decrease pain by 25 % x4 weeks        Plan  Patient would benefit from: skilled physical therapy  Planned therapy interventions: therapeutic exercise, strengthening, home exercise program, neuromuscular re-education, manual therapy and stretching  Frequency: 1x week  Duration in weeks: 4        Subjective Evaluation    History of Present Illness  Mechanism of injury: Pt was initially performing HEP when she ended up falling again and landing directly onto left knee. Pt reports having f/u with ortho and encouraged to return to PT. Pt reports currently limited with performing cleaning activities in which she can only vacuum 2 rooms before needing to rest secondary to pain. Pt reports that stairs are a struggle in which she performs non-reciprocally.   Pt does note cramping along adductor when she's sleeping especially in SL position. Pt is taking Aleve primarily before bed. Pt does note more edema since initial fall and incident, and struggling with change of position due to pain and edema. Pt struggles to walk her dog on uneven surfaces secondary to high levels of pain. Patient Goals  Patient goals for therapy: decreased pain and independence with ADLs/IADLs    Pain  Current pain ratin  At best pain ratin  At worst pain ratin      Diagnostic Tests  X-ray: normal        Objective     Active Range of Motion   Left Knee   Flexion: 115 degrees   Extension: -10 degrees     Additional Active Range of Motion Details  Pt has pain into lateral joint line with extension actively and quad superior to patella with flexion. Pt has mild effusion present but soft tissue endfeel with both flex/ext. Passive Range of Motion   Left Knee   Flexion: 115 degrees   Extension: -10 degrees     Strength/Myotome Testing     Left Knee   Flexion: 4+  Extension: 3+    Tests     Left Knee   Negative anterior drawer, Apley's compression, Apley's distraction, bounce home, lateral Tee, medial Tee, patellar apprehension, posterior drawer, valgus stress test at 0 degrees, valgus stress test at 30 degrees, varus stress test at 0 degrees and varus stress test at 30 degrees.              Precautions: None        Manual  23       grade III tibial glides   10 mins           RE   25 mins                                                          Neuro Re-Ed             bridges                                                                                    Therex            QS  5"x10        SLR   nv          heel slides   nv                                                                                              Gait Training                                 Modalities

## 2023-08-22 ENCOUNTER — OFFICE VISIT (OUTPATIENT)
Dept: PHYSICAL THERAPY | Facility: REHABILITATION | Age: 63
End: 2023-08-22
Payer: COMMERCIAL

## 2023-08-22 DIAGNOSIS — M25.562 ACUTE PAIN OF LEFT KNEE: Primary | ICD-10-CM

## 2023-08-22 PROCEDURE — 97110 THERAPEUTIC EXERCISES: CPT | Performed by: PHYSICAL THERAPIST

## 2023-08-22 PROCEDURE — 97140 MANUAL THERAPY 1/> REGIONS: CPT | Performed by: PHYSICAL THERAPIST

## 2023-08-22 NOTE — PROGRESS NOTES
Daily Note     Today's date: 2023  Patient name: Shaista Russo  : 1960  MRN: [id number]  Referring provider: Benja Alvarez DO  Dx:   Encounter Diagnosis     ICD-10-CM    1. Acute pain of left knee  M25.562                      Subjective: Pt reports that she has improved with less pain and sx's, but overall cont to note stiffness and edema surrounding her left knee. Objective: See treatment diary below         Precautions: None        Manual  23        grade III tibial glides   10 mins   15 mins         RE   25 mins                                                          Neuro Re-Ed             bridges                                                                                                               Therex             QS   5"x10   5"x10        SLR   nv  3"x10         heel slides   nv  5"x10         SAQ     3"x10         rec  bike     5 mins                                                                 Gait Training                                 Modalities                                                                Assessment: Pt demo's improved extension into left knee, cont to improve with manual tx for tibial glides into extension. Pt able to perform TKE and appropriate quad contraction with improvement in knee extension. Pt cont to have edema surrounding left patella which is causing some restriction of endrange ROM into flex/ext, dissipates with manual and exercises. Plan: Continue per plan of care.

## 2023-08-25 ENCOUNTER — OFFICE VISIT (OUTPATIENT)
Dept: PHYSICAL THERAPY | Facility: REHABILITATION | Age: 63
End: 2023-08-25
Payer: COMMERCIAL

## 2023-08-25 DIAGNOSIS — M25.562 ACUTE PAIN OF LEFT KNEE: Primary | ICD-10-CM

## 2023-08-25 PROCEDURE — 97110 THERAPEUTIC EXERCISES: CPT | Performed by: PHYSICAL THERAPIST

## 2023-08-25 PROCEDURE — 97140 MANUAL THERAPY 1/> REGIONS: CPT | Performed by: PHYSICAL THERAPIST

## 2023-08-25 NOTE — PROGRESS NOTES
Daily Note     Today's date: 2023  Patient name: Patrica Musa  : 1960  MRN: [id number]  Referring provider: Helen Mullen DO  Dx:   Encounter Diagnosis     ICD-10-CM    1. Acute pain of left knee  M25.562                      Subjective: Pt reports doing well after last visit, but cont to be limited with ambulation secondary to edema into left knee. Pt had sharp, stabbing pain that occurs at times with amb when left knee extends all the way, noting that she feels as though it "hyperextends."        Objective: See treatment diary below         Precautions: None        Manual  23      grade III tibial glides   10 mins   15 mins   15 mins       RE   25 mins           sitting grade II distraction MWM flex/ext       10 mins                                        Neuro Re-Ed             bridges                                                                                                               Therex             QS   5"x10   5"x10   hep     SLR   nv  3"x10   3"2x10       heel slides   nv  5"x10   hep      SAQ     3"x10   2x10      rec  bike     5 mins   6 mins      hip add isomet       5"x10                                                 Gait Training                                 Modalities              game ready       50 deg, mod compress x10 mins                                           Assessment: Added distraction mobs to assist with ROM and flexibility all performed without pain. Pt cont to have most diff with TKE actively, improved post mobilizations but does have positive edema superior to patella that also causes restriction of patellar superior glides with quad activation. Ended session with game ready to assist with edema and strongly encouraged pt to ice at home especially post ambulation to assist with edema control and improve quad activation to TKE for control. Plan: Continue per plan of care.

## 2023-08-28 ENCOUNTER — OFFICE VISIT (OUTPATIENT)
Dept: PHYSICAL THERAPY | Facility: REHABILITATION | Age: 63
End: 2023-08-28
Payer: COMMERCIAL

## 2023-08-28 DIAGNOSIS — M25.562 ACUTE PAIN OF LEFT KNEE: Primary | ICD-10-CM

## 2023-08-28 PROCEDURE — 97150 GROUP THERAPEUTIC PROCEDURES: CPT | Performed by: PHYSICAL THERAPIST

## 2023-08-28 PROCEDURE — 97140 MANUAL THERAPY 1/> REGIONS: CPT | Performed by: PHYSICAL THERAPIST

## 2023-08-28 NOTE — PROGRESS NOTES
Daily Note     Today's date: 2023  Patient name: Macy Sloan  : 1960  MRN: [id number]  Referring provider: Zafar Brown DO  Dx:   Encounter Diagnosis     ICD-10-CM    1. Acute pain of left knee  M25.562                      Subjective: Pt notes improvement in sx's over the weekend with her walking, but does cont to feel edema surrounding left patella and feels as though her left knee "hyperextends."        Objective: See treatment diary below         Precautions: None        Manual  23    grade III tibial glides   10 mins   15 mins   15 mins   15 mins     RE   25 mins           sitting grade II distraction MWM flex/ext       10 mins   10 mins                                      Neuro Re-Ed             bridges                                                                                                               Therex             QS   5"x10   5"x10   hep     SLR   nv  3"x10   3"2x10       heel slides   nv  5"x10   hep      SAQ     3"x10   2x10      rec  bike     5 mins   6 mins  8 mins     hip add isomet       5"x10                                                 Gait Training                                 Modalities              game ready       50 deg, mod compress x10 mins   50 deg, mod compress x10 mins                                        Assessment: Pt has improved knee extension ROM and tibial gliding with less pain, however does cont to be restricted secondary to edema. Good focus on knee flexion ROM during rec bike and encouraged to cont with HEP to assist with ROM/flexibility left LE. Resumed game ready at today's visit secondary to improvement after last session and will assess again at NV. Pt also encouraged to cont to use ice as part of HEP especially after walking as this causes pt's sx's most frequently. Plan: Continue per plan of care.        Pt treated group 11:45-11:53 AM.

## 2023-09-01 ENCOUNTER — OFFICE VISIT (OUTPATIENT)
Dept: PHYSICAL THERAPY | Facility: REHABILITATION | Age: 63
End: 2023-09-01
Payer: COMMERCIAL

## 2023-09-01 DIAGNOSIS — M25.562 ACUTE PAIN OF LEFT KNEE: Primary | ICD-10-CM

## 2023-09-01 PROCEDURE — 97110 THERAPEUTIC EXERCISES: CPT | Performed by: PHYSICAL THERAPIST

## 2023-09-01 PROCEDURE — 97140 MANUAL THERAPY 1/> REGIONS: CPT | Performed by: PHYSICAL THERAPIST

## 2023-09-01 NOTE — PROGRESS NOTES
Daily Note     Today's date: 2023  Patient name: Shaista Russo  : 1960  MRN: [id number]  Referring provider: Benja Alvarez DO  Dx:   Encounter Diagnosis     ICD-10-CM    1. Acute pain of left knee  M25.562                      Subjective: Pt reports that she cont to progress well with her walking with less pain. Pt cont to c/o edema to left knee. Objective: See treatment diary below         Precautions: None        Manual  23    grade III tibial glides  10 mins 10 mins   15 mins   15 mins   15 mins     RE   25 mins           sitting grade II distraction MWM flex/ext  10 mins      10 mins   10 mins     patellar mobs all direct 10 mins                                Neuro Re-Ed             bridges 5"x10                                                                                                              Therex             QS   5"x10   5"x10   hep     SLR  3"2x10  nv  3"x10   3"2x10       heel slides   nv  5"x10   hep      SAQ  2# 2x10    3"x10   2x10      rec  bike  8 mins    5 mins   6 mins  8 mins     hip add isomet       5"x10                                                 Gait Training                                 Modalities              game ready  deferred     50 deg, mod compress x10 mins   50 deg, mod compress x10 mins                                        Assessment: Pt has less edema overall into left knee in which she has more tolerance to patellar mobilizations and less restriction through knee ROM. Pt able to complete strengthening program without increase in pain and able to perform to terminal extension without sx's. Pt defers ice as she's not having pain post PT. Plan: Continue per plan of care.

## 2023-09-08 ENCOUNTER — OFFICE VISIT (OUTPATIENT)
Dept: PHYSICAL THERAPY | Facility: REHABILITATION | Age: 63
End: 2023-09-08
Payer: COMMERCIAL

## 2023-09-08 DIAGNOSIS — M25.562 ACUTE PAIN OF LEFT KNEE: Primary | ICD-10-CM

## 2023-09-08 PROCEDURE — 97140 MANUAL THERAPY 1/> REGIONS: CPT | Performed by: PHYSICAL THERAPIST

## 2023-09-08 PROCEDURE — 97110 THERAPEUTIC EXERCISES: CPT | Performed by: PHYSICAL THERAPIST

## 2023-09-08 NOTE — PROGRESS NOTES
Daily Note     Today's date: 2023  Patient name: Abrahan Cano  : 1960  MRN: [id number]  Referring provider: Ender Parker DO  Dx:   Encounter Diagnosis     ICD-10-CM    1. Acute pain of left knee  M25.562                      Subjective: Pt reports improvement in left knee since beginning PT, but cont to c/o stiffness especially when getting OOB in AM and feelings on instability with walking on unstable surfaces (ie, grass, in the woods) when walking her dog. Pt also cont to report edema and pain with ambulation. Objective: See treatment diary below         Precautions: None        Manual  23    grade III tibial glides  10 mins 10 mins   15 mins   15 mins   15 mins     RE             sitting grade II distraction MWM flex/ext  10 mins   10 mins   10 mins   10 mins     patellar mobs all direct 10 mins   10 mins                             Neuro Re-Ed             bridges 5"x10                                                                                                              Therex             QS     5"x10   hep     SLR  3"2x10  3"2x10  3"x10   3"2x10       heel slides   reviewed  5"x10   hep      SAQ  2# 2x10    3"x10   2x10      rec  bike  8 mins  8 mins  5 mins   6 mins  8 mins     hip add isomet       5"x10                                                 Gait Training                                 Modalities              game ready  deferred  deferred   50 deg, mod compress x10 mins   50 deg, mod compress x10 mins                                        Assessment: Pt notes improvement post manuals in pain and improvement in knee ROM. Pt cont to has positive edema superior lateral to patella and pt states "always there."  Pt has been progressing well in PT, but does have limitations of carry over from visit to visit secondary to pain, restrictions of ROM, and stiffness.   Pt encouraged to make f/u with ortho at this time, will f/u with PT in 1 week. Plan: Continue per plan of care.

## 2023-09-15 ENCOUNTER — OFFICE VISIT (OUTPATIENT)
Dept: PHYSICAL THERAPY | Facility: REHABILITATION | Age: 63
End: 2023-09-15
Payer: COMMERCIAL

## 2023-09-15 DIAGNOSIS — M25.562 ACUTE PAIN OF LEFT KNEE: Primary | ICD-10-CM

## 2023-09-15 PROCEDURE — 97140 MANUAL THERAPY 1/> REGIONS: CPT | Performed by: PHYSICAL THERAPIST

## 2023-09-15 PROCEDURE — 97110 THERAPEUTIC EXERCISES: CPT | Performed by: PHYSICAL THERAPIST

## 2023-09-15 NOTE — PROGRESS NOTES
PT Re-Evaluation     Today's date: 9/15/2023  Patient name: Abrahan Cano  : 1960  MRN: [id number]  Referring provider: Ender Parker DO  Dx:   Encounter Diagnosis     ICD-10-CM    1. Acute pain of left knee  M25.562                      Assessment  Assessment details: Pt has made some progress in PT with increase in funct tolerance to negotiation of stairs and improved strength, however pt is restricted with knee ROM flexion and extension with positive edema, decrease in tolerance to ambulation especially when descending step, and positive pain with plant and twisting motion during ambulation on uneven surfaces when walking with her dog. Pt is scheduled to f/u with ortho on Monday and may benefit from further dx testing at this time. Hold from PT until after f/u with ortho. Goals  Funct 1. Normal amb without restriction x4 weeks. -partially met  2. Return to descending incline/stairs without restriction x4 weeks. -partially met  Impairment 1. Increase strength by 1/2 grade x4 weeks. -partially met  2. Increase ROM by 25% x4 weeks-not met  3. Decrease pain by 25 % x4 weeks-not met            Subjective Evaluation    History of Present Illness  Mechanism of injury: Pt notes that she has been cont to have left knee pain and stiffness. Pt reports most stiffness occurs in AM when getting OOB and after sitting prolonged, improves with movement, but cont to be restrictive. Pt is negotiating stairs reciprocally but very cautious and using railing as she notes left knee has sharp pain at times. Pt denies cramping at night but still has pain in laying positions. Pt is limited with ambulation when walking her dog secondary to attempting to keep up with her dog. Pt notes most recent incident stepping down from curb landing left LE and had sharp, stabbing pain a few days prior. Pt does note she was able to cont to walk, but needed to rest for a minute.    Patient Goals  Patient goals for therapy: decreased pain and independence with ADLs/IADLs    Pain  Current pain ratin  At best pain ratin  At worst pain ratin      Diagnostic Tests  X-ray: normal        Objective     Active Range of Motion   Left Knee   Flexion: 115 degrees   Extension: -8 degrees     Passive Range of Motion   Left Knee   Flexion: 115 degrees   Extension: -6 degrees     Additional Passive Range of Motion Details  Pt has positive restriction into endrange flexion and extension and positive edema along med/lat joint space and along patella. Strength/Myotome Testing     Left Knee   Flexion: 4+  Extension: 4    Additional Strength Details  SLR without lag    Tests     Left Knee   Positive lateral Tee, medial Tee and Thessaly's test at 5 degrees.    Negative anterior drawer, Apley's compression, Apley's distraction, bounce home, patellar apprehension, posterior drawer, valgus stress test at 0 degrees, valgus stress test at 30 degrees, varus stress test at 0 degrees and varus stress test at 30 degrees.          Precautions: None        Manual  9/1/23 9/8/23 9/15/23  8/25/23  8/28/23    grade III tibial glides  10 mins 10 mins    15 mins   15 mins     RE     30 mins        sitting grade II distraction MWM flex/ext  10 mins   10 mins   10 mins   10 mins     patellar mobs all direct 10 mins   10 mins                             Neuro Re-Ed             bridges 5"x10                                                                                                              Therex             QS       hep     SLR  3"2x10  3"2x10 reviewed  3"2x10       heel slides   reviewed reviewed  hep      SAQ  2# 2x10      2x10      rec  bike  8 mins  8 mins  8 mins  6 mins  8 mins     hip add isomet       5"x10                                                 Gait Training                                 Modalities              game ready  deferred  deferred  np 50 deg, mod compress x10 mins   50 deg, mod compress x10 mins

## 2023-09-18 ENCOUNTER — OFFICE VISIT (OUTPATIENT)
Dept: FAMILY MEDICINE CLINIC | Facility: CLINIC | Age: 63
End: 2023-09-18
Payer: COMMERCIAL

## 2023-09-18 ENCOUNTER — OFFICE VISIT (OUTPATIENT)
Dept: OBGYN CLINIC | Facility: MEDICAL CENTER | Age: 63
End: 2023-09-18
Payer: COMMERCIAL

## 2023-09-18 VITALS
HEIGHT: 69 IN | DIASTOLIC BLOOD PRESSURE: 79 MMHG | BODY MASS INDEX: 29.37 KG/M2 | WEIGHT: 198.3 LBS | HEART RATE: 79 BPM | SYSTOLIC BLOOD PRESSURE: 120 MMHG

## 2023-09-18 VITALS
TEMPERATURE: 96.6 F | SYSTOLIC BLOOD PRESSURE: 118 MMHG | HEART RATE: 103 BPM | DIASTOLIC BLOOD PRESSURE: 82 MMHG | WEIGHT: 198.2 LBS | OXYGEN SATURATION: 98 % | RESPIRATION RATE: 16 BRPM | HEIGHT: 69 IN | BODY MASS INDEX: 29.36 KG/M2

## 2023-09-18 DIAGNOSIS — I10 PRIMARY HYPERTENSION: ICD-10-CM

## 2023-09-18 DIAGNOSIS — M25.462 PAIN AND SWELLING OF LEFT KNEE: Primary | ICD-10-CM

## 2023-09-18 DIAGNOSIS — M25.562 PAIN AND SWELLING OF LEFT KNEE: Primary | ICD-10-CM

## 2023-09-18 DIAGNOSIS — M23.92 INTERNAL DERANGEMENT OF LEFT KNEE: ICD-10-CM

## 2023-09-18 DIAGNOSIS — E66.3 OVERWEIGHT (BMI 25.0-29.9): ICD-10-CM

## 2023-09-18 DIAGNOSIS — F41.9 ANXIETY: Primary | ICD-10-CM

## 2023-09-18 DIAGNOSIS — F32.A DEPRESSION, UNSPECIFIED DEPRESSION TYPE: ICD-10-CM

## 2023-09-18 PROCEDURE — 99213 OFFICE O/P EST LOW 20 MIN: CPT | Performed by: ORTHOPAEDIC SURGERY

## 2023-09-18 PROCEDURE — 99214 OFFICE O/P EST MOD 30 MIN: CPT | Performed by: FAMILY MEDICINE

## 2023-09-18 RX ORDER — ESCITALOPRAM OXALATE 5 MG/1
5 TABLET ORAL DAILY
Qty: 30 TABLET | Refills: 5 | Status: SHIPPED | OUTPATIENT
Start: 2023-09-18

## 2023-09-18 NOTE — PROGRESS NOTES
Ortho Sports Medicine Knee Visit     Assesment:   left knee concern for lateral meniscal tear    Plan:    Conservative treatment:    Ice to knee for 20 minutes at least 1-2 times daily. OTC NSAIDS prn for pain. MRI left knee evaluate lateral meniscal tear. See patient back to review MRI to determine future course of medical treatment. Imaging: We will obtain an MRI of the left knee to rule out lateral meniscal tear      Injection:    No Injection planned at this time. May consider future corticosteroid injection depending on clinical exam/imaging. Surgery:     No surgery is recommended at this point, continue with conservative treatment plan as noted. History of Present Illness: The patient is returns for follow up of her left knee. Concern for meniscal tear. 06/13/2023 with a fall and hit to the knee on the stairs with hyperextension, twist.    She has been attending physical therapy and seen some improvement. She continues to note anterior, medial, lateral, posterior knee pain on daily basis with swelling. Symptoms worse with prolonged walking, standing, hills, stairs. Symptoms include clicking, popping and swelling. The patient has tried rest, ice, NSAIDS and physical therapy. I have reviewed the past medical, surgical, social and family history, medications and allergies as documented in the EMR. Review of systems: ROS is negative other than that noted in the HPI. Constitutional: Negative for fatigue and fever.    Cardiovascular: Negative for chest pain  Pulmonary: negative for shortness of breath    PMH/PSH:  Past Medical History:   Diagnosis Date   • Hypertension    • Sleep apnea      Past Surgical History:   Procedure Laterality Date   • ADENOIDECTOMY     • BUNIONECTOMY     • NASAL SEPTUM SURGERY     • RHINOPLASTY     • TONSILLECTOMY          Physical Exam:    Blood pressure 120/79, pulse 79, height 5' 9" (1.753 m), weight 89.9 kg (198 lb 4.8 oz).    General/Constitutional: NAD, well developed, well nourished  HENT: Normocephalic, atraumatic  CV: Intact distal pulses, regular rate  Resp: No respiratory distress or labored breathing  Lymphatic: No lymphadenopathy palpated  Neuro: Alert and Oriented x 3, no focal deficits  Psych: Normal mood, normal affect, normal judgement, normal behavior  Skin: Warm, dry, no rashes, no erythema       Knee Exam (focused):                   RIGHT LEFT   ROM:   0-130 0-130   Palpation: Effusion negative negative     MJL tenderness Negative Negative     LJL tenderness Negative Positive   Instability: Varus stable stable     Valgus stable stable   Special Tests: Lachman Negative Negative     Posterior drawer Negative Negative     Anterior drawer Negative Negative     Pivot shift not tested not tested     Dial not tested not tested   Patella: Palpation no tenderness no tenderness     Mobility 1/4 1/4     Apprehension Negative Negative   Other: paul not tested Positive       LE NV Exam: +2 DP/PT pulses bilaterally  Sensation intact to light touch L2-S1 bilaterally    No calf tenderness to palpation bilaterally      Knee Imaging    No new imaging to review     Scribe Attestation    I,:  José Luis am acting as a scribe while in the presence of the attending physician.:       I,:  Nicole Campbell, DO personally performed the services described in this documentation    as scribed in my presence.:

## 2023-09-18 NOTE — PROGRESS NOTES
Name: Deng Jacques      : 1960      MRN: [id number]  Encounter Provider: Kay Bradley DO  Encounter Date: 2023   Encounter department: [address] [place] [address]  Chief Complaint   Patient presents with   • Medication Management     Pt is here to discuss restarting Lexapro      Patient Instructions   Here for anxiety and depression and will restart lexapro 10 mg daily and recheck in 1 month. Lose weight as directed to get BMI lower than 25. Assessment & Plan     1. Anxiety  -     escitalopram (LEXAPRO) 5 mg tablet; Take 1 tablet (5 mg total) by mouth daily    2. Depression, unspecified depression type  -     escitalopram (LEXAPRO) 5 mg tablet; Take 1 tablet (5 mg total) by mouth daily    3. Primary hypertension    4. Overweight (BMI 25.0-29. 9)           Subjective      Medication Management (Pt is here to discuss restarting Lexapro ) Patient sees cunselor every 2 weeks. Review of Systems   Constitutional: Negative. HENT: Negative. Eyes: Negative. Respiratory: Negative. Cardiovascular: Negative. Gastrointestinal: Negative. Endocrine: Negative. Genitourinary: Negative. Musculoskeletal: Negative. Skin: Negative. Allergic/Immunologic: Negative. Neurological: Negative. Hematological: Negative.     Psychiatric/Behavioral:        Anxiety and depression       Current Outpatient Medications on File Prior to Visit   Medication Sig   • candesartan (ATACAND) 16 mg tablet take 1 tablet by mouth once daily   • Cholecalciferol 125 MCG (5000 UT) capsule Take 1 capsule by mouth daily   • Omega-3 Fatty Acids (FISH OIL PO) Take 2 g by mouth daily    • tretinoin (REFISSA) 0.05 % cream APPLY A PEA SIZED AMOUNT TO ENTIRE FACE  EVERY NIGHT AT BEDTIME  ONE HOUR AFTER WASHING FACE       Objective     /82   Pulse 103   Temp (!) 96.6 °F (35.9 °C) (Temporal)   Resp 16   Ht 5' 9" (1.753 m)   Wt 89.9 kg (198 lb 3.2 oz)   SpO2 98%   BMI 29.27 kg/m² Physical Exam  Constitutional:       Appearance: She is well-developed. Comments: overweight   HENT:      Head: Normocephalic and atraumatic. Eyes:      Conjunctiva/sclera: Conjunctivae normal.      Pupils: Pupils are equal, round, and reactive to light. Cardiovascular:      Rate and Rhythm: Normal rate and regular rhythm. Pulses: Normal pulses. Heart sounds: Normal heart sounds. Pulmonary:      Effort: Pulmonary effort is normal.      Breath sounds: Normal breath sounds. Musculoskeletal:         General: Normal range of motion. Cervical back: Normal range of motion and neck supple. Skin:     General: Skin is warm and dry. Capillary Refill: Capillary refill takes less than 2 seconds. Neurological:      General: No focal deficit present. Mental Status: She is alert and oriented to person, place, and time. Mental status is at baseline. Deep Tendon Reflexes: Reflexes are normal and symmetric. Psychiatric:         Mood and Affect: Mood normal.         Behavior: Behavior normal.         Thought Content:  Thought content normal.         Judgment: Judgment normal.      Comments: Anxiety and depression       Levell Falls, DO

## 2023-09-18 NOTE — PATIENT INSTRUCTIONS
Here for anxiety and depression and will restart lexapro 10 mg daily and recheck in 1 month. Lose weight as directed to get BMI lower than 25.

## 2023-09-27 ENCOUNTER — HOSPITAL ENCOUNTER (OUTPATIENT)
Dept: MRI IMAGING | Facility: HOSPITAL | Age: 63
Discharge: HOME | End: 2023-09-27
Attending: ORTHOPAEDIC SURGERY
Payer: COMMERCIAL

## 2023-09-27 DIAGNOSIS — M23.92 INTERNAL DERANGEMENT OF LEFT KNEE: ICD-10-CM

## 2023-09-27 PROCEDURE — 73721 MRI JNT OF LWR EXTRE W/O DYE: CPT

## 2023-09-27 PROCEDURE — G1004 CDSM NDSC: HCPCS

## 2023-10-06 ENCOUNTER — OFFICE VISIT (OUTPATIENT)
Dept: OBGYN CLINIC | Facility: MEDICAL CENTER | Age: 63
End: 2023-10-06
Payer: COMMERCIAL

## 2023-10-06 VITALS
HEIGHT: 69 IN | WEIGHT: 198.4 LBS | BODY MASS INDEX: 29.38 KG/M2 | DIASTOLIC BLOOD PRESSURE: 86 MMHG | HEART RATE: 88 BPM | SYSTOLIC BLOOD PRESSURE: 134 MMHG

## 2023-10-06 DIAGNOSIS — M76.32 ILIOTIBIAL BAND SYNDROME OF LEFT SIDE: Primary | ICD-10-CM

## 2023-10-06 PROCEDURE — 99213 OFFICE O/P EST LOW 20 MIN: CPT | Performed by: ORTHOPAEDIC SURGERY

## 2023-10-06 NOTE — PROGRESS NOTES
Ortho Sports Medicine Knee Visit     Assesment:   left knee IT band tendonitis, MRI left knee does show posterior root meniscal tear asymptomatic with moderate arthritis    Plan:    Conservative treatment:    Ice to knee for 20 minutes at least 1-2 times daily. OTC NSAIDS prn for pain. Use voltaren gel for pain. Primary lateral knee pain source distal IT band  PT to rehab left knee, strengthening, motion, stretching. See back in 2 months    Imaging: All imaging reviewed       Injection:    No Injection planned at this time. May consider future corticosteroid injection depending on clinical exam/imaging. Surgery:     No surgery is recommended at this point, continue with conservative treatment plan as noted. History of Present Illness: The patient is returns for follow up of her left knee. She is here to review MRI of left knee. 06/13/2023 with a fall and hit to the knee on the stairs with hyperextension, twist.    She continues to note pain medial and lateral knee but pain is always worse lateral aspect. Symptoms worse with prolonged walking, standing, hills, stairs. Symptoms include clicking, popping and swelling. The patient has tried rest, ice, NSAIDS and physical therapy. I have reviewed the past medical, surgical, social and family history, medications and allergies as documented in the EMR. Review of systems: ROS is negative other than that noted in the HPI. Constitutional: Negative for fatigue and fever.    Cardiovascular: Negative for chest pain  Pulmonary: negative for shortness of breath    PMH/PSH:  Past Medical History:   Diagnosis Date   • Hypertension    • Sleep apnea      Past Surgical History:   Procedure Laterality Date   • ADENOIDECTOMY     • BUNIONECTOMY     • NASAL SEPTUM SURGERY     • RHINOPLASTY     • TONSILLECTOMY          Physical Exam:    Blood pressure 134/86, pulse 88, height 5' 9" (1.753 m), weight 90 kg (198 lb 6.4 oz).    General/Constitutional: NAD, well developed, well nourished  HENT: Normocephalic, atraumatic  CV: Intact distal pulses, regular rate  Resp: No respiratory distress or labored breathing  Lymphatic: No lymphadenopathy palpated  Neuro: Alert and Oriented x 3, no focal deficits  Psych: Normal mood, normal affect, normal judgement, normal behavior  Skin: Warm, dry, no rashes, no erythema       Knee Exam (focused): RIGHT LEFT   ROM:   0-130 0-130   Palpation: Effusion negative negative     MJL tenderness Negative Negative     LJL tenderness Negative Positive distal IT band   Instability: Varus stable stable     Valgus stable stable   Special Tests: Lachman Negative Negative     Posterior drawer Negative Negative     Anterior drawer Negative Negative     Pivot shift not tested not tested     Dial not tested not tested   Patella: Palpation no tenderness no tenderness     Mobility 1/4 1/4     Apprehension Negative Negative   Other: paul not tested Positive       LE NV Exam: +2 DP/PT pulses bilaterally  Sensation intact to light touch L2-S1 bilaterally    No calf tenderness to palpation bilaterally      Knee Imaging    MRI left knee demonstrates medial meniscal posterior root tear, lateral meniscus intact, moderate to severe medial arthritis.       Scribe Attestation    I,:  Yelitza Araiza am acting as a scribe while in the presence of the attending physician.:       I,:  Luanne Nicholson DO personally performed the services described in this documentation    as scribed in my presence.:

## 2023-10-11 ENCOUNTER — RA CDI HCC (OUTPATIENT)
Dept: OTHER | Facility: HOSPITAL | Age: 63
End: 2023-10-11

## 2023-10-11 NOTE — PROGRESS NOTES
[address] [facility] coding opportunities          Chart Reviewed number of suggestions sent to Provider: 1   F32.a  Depression noted on 9/18/23 visit. Please further classify as per ICD 10 coding guidelines.    Patients Insurance        Commercial Insurance: Joe Zacarias

## 2023-10-12 ENCOUNTER — EVALUATION (OUTPATIENT)
Dept: PHYSICAL THERAPY | Facility: REHABILITATION | Age: 63
End: 2023-10-12
Payer: COMMERCIAL

## 2023-10-12 DIAGNOSIS — M25.562 ACUTE PAIN OF LEFT KNEE: Primary | ICD-10-CM

## 2023-10-12 DIAGNOSIS — M76.32 ILIOTIBIAL BAND SYNDROME OF LEFT SIDE: ICD-10-CM

## 2023-10-12 PROCEDURE — 97140 MANUAL THERAPY 1/> REGIONS: CPT | Performed by: PHYSICAL THERAPIST

## 2023-10-12 PROCEDURE — 97112 NEUROMUSCULAR REEDUCATION: CPT | Performed by: PHYSICAL THERAPIST

## 2023-10-12 NOTE — PROGRESS NOTES
PT Re-Evaluation     Today's date: 10/12/2023  Patient name: Neo Perkins  : 1960  MRN: [id number]  Referring provider: William Luna DO  Dx:   Encounter Diagnosis     ICD-10-CM    1. Acute pain of left knee  M25.562       2. Iliotibial band syndrome of left side  M76.32 Ambulatory Referral to Physical Therapy                     Assessment  Assessment details: Pt presents to PT for RE with similar findings prior to being placed on hold for MRI. Pt has positive restrictions with ambulation and pivot or change in terrain and direction due to left knee pain. Pt has positive hip weakness, left LE weakness, diminished knee ROM, and positive edema. Pt would benefit from cont skilled PT to address these limitations and max funct. Impairments: abnormal or restricted ROM, impaired physical strength, lacks appropriate home exercise program and pain with function    Goals  Funct 1. Normal amb without restriction x4 weeks. -partially met  2. Return to descending incline/stairs without restriction x4 weeks. -partially met  Impairment 1. Increase strength by 1/2 grade x4 weeks. -partially met  2. Increase ROM by 25% x4 weeks-not met  3. Decrease pain by 25 % x4 weeks-not met        Plan  Planned therapy interventions: joint mobilization, manual therapy, neuromuscular re-education, patient education, strengthening, stretching, therapeutic exercise and home exercise program  Frequency: 2x week  Duration in weeks: 4        Subjective Evaluation    History of Present Illness  Mechanism of injury: Pt reports to PT after having an MRI of left knee and f/u with ortho. Pt reports frustration in the fact that she cont to be limited with walking her dog and also increase in pain and sx's with pivoting. Pt cont to have positive edema to left knee and restriction of ROM.   Pt's MRI was positive for findings of posterior horn medial meniscus tear and was recommended that pt returns to PT to assist with knee pain and ITB syndrome. Pt is waking with change of positions due to increase in pain and feels limited with ability to return to full ambulation without restrictions. Patient Goals  Patient goals for therapy: decreased pain and independence with ADLs/IADLs    Pain  Current pain ratin  At best pain ratin  At worst pain ratin      Diagnostic Tests  X-ray: normal        Objective     Active Range of Motion   Left Knee   Flexion: 115 degrees   Extension: -5 degrees     Passive Range of Motion   Left Knee   Flexion: 115 degrees with pain  Extension: -5 degrees with pain    Additional Passive Range of Motion Details  Pt cont to have positive edema surrounding patella and into joint space at endrange flexion available. Strength/Myotome Testing     Left Hip   Planes of Motion   Flexion: 5  Extension: 4  Abduction: 5  Adduction: 5  External rotation: 5  Internal rotation: 5    Left Knee   Flexion: 4+  Extension: 4    Additional Strength Details  SLS 30 sec without LOB on even surface. SLR without lag    Tests     Left Knee   Positive lateral Tee, medial Tee and Thessaly's test at 5 degrees. Negative anterior drawer, Apley's compression, Apley's distraction, bounce home, patellar apprehension, posterior drawer, valgus stress test at 0 degrees, valgus stress test at 30 degrees, varus stress test at 0 degrees and varus stress test at 30 degrees.         Precautions: None        Manual  9/1/23 9/8/23 9/15/23 10/12/23     grade III tibial glides  10 mins 10 mins         RE     30 mins 30 mins       sitting grade II distraction MWM flex/ext  10 mins   10 mins        patellar mobs all direct 10 mins   10 mins                             Neuro Re-Ed             bridges 5"x10       5"x10       prone hip ext        3"x10 ea      mini squats        3"x10       SLS foam       15"x3                                                              Therex             QS            SLR  3"2x10  3"2x10 reviewed       heel slides   reviewed reviewed       SAQ  2# 2x10            rec  bike  8 mins  8 mins  8 mins      hip add isomet                                                       Gait Training                                 Modalities              game ready  deferred  deferred  np

## 2023-10-18 ENCOUNTER — APPOINTMENT (OUTPATIENT)
Dept: PHYSICAL THERAPY | Facility: REHABILITATION | Age: 63
End: 2023-10-18
Payer: COMMERCIAL

## 2023-10-18 ENCOUNTER — OFFICE VISIT (OUTPATIENT)
Dept: FAMILY MEDICINE CLINIC | Facility: CLINIC | Age: 63
End: 2023-10-18
Payer: COMMERCIAL

## 2023-10-18 VITALS
BODY MASS INDEX: 29.36 KG/M2 | HEART RATE: 116 BPM | SYSTOLIC BLOOD PRESSURE: 134 MMHG | WEIGHT: 198.2 LBS | DIASTOLIC BLOOD PRESSURE: 74 MMHG | OXYGEN SATURATION: 95 % | HEIGHT: 69 IN | TEMPERATURE: 96.4 F

## 2023-10-18 DIAGNOSIS — F41.9 ANXIETY: ICD-10-CM

## 2023-10-18 DIAGNOSIS — F32.A DEPRESSION, UNSPECIFIED DEPRESSION TYPE: ICD-10-CM

## 2023-10-18 DIAGNOSIS — E55.9 VITAMIN D DEFICIENCY: ICD-10-CM

## 2023-10-18 DIAGNOSIS — I10 PRIMARY HYPERTENSION: Primary | ICD-10-CM

## 2023-10-18 DIAGNOSIS — E66.3 OVERWEIGHT (BMI 25.0-29.9): ICD-10-CM

## 2023-10-18 PROCEDURE — 99214 OFFICE O/P EST MOD 30 MIN: CPT | Performed by: FAMILY MEDICINE

## 2023-10-18 NOTE — PROGRESS NOTES
Chief Complaint   Patient presents with    Follow-up     Follow up of anxiety and depression     Patient Instructions   Doing well with BP and anxiety depression on medication. Continue with currents meds and rec recheck in 6 months. Eat healthy and exercise as tolerated. Take Vitamin D3 as directed and call if any problems. Patient is getting a 2nd opinion for left knee pain. Assessment/Plan:    No problem-specific Assessment & Plan notes found for this encounter. Diagnoses and all orders for this visit:    Primary hypertension    Anxiety    Depression, unspecified depression type    Vitamin D deficiency    Overweight (BMI 25.0-29. 9)          Subjective:      Patient ID: Jari Schlatter is a 66 y.o. female. Follow-up (Follow up of anxiety and depression)        The following portions of the patient's history were reviewed and updated as appropriate: allergies, current medications, past family history, past medical history, past social history, past surgical history, and problem list.    Review of Systems   Constitutional: Negative. HENT: Negative. Eyes: Negative. Respiratory: Negative. Cardiovascular: Negative. Gastrointestinal: Negative. Endocrine: Negative. Genitourinary: Negative. Musculoskeletal: Negative. Skin: Negative. Allergic/Immunologic: Negative. Neurological: Negative. Hematological: Negative. Psychiatric/Behavioral: Negative. Anxiety and depression         Objective:      /74 (BP Location: Left arm, Patient Position: Sitting, Cuff Size: Adult)   Pulse (!) 116   Temp (!) 96.4 °F (35.8 °C)   Ht 5' 8.5" (1.74 m)   Wt 89.9 kg (198 lb 3.2 oz)   SpO2 95%   BMI 29.70 kg/m²          Physical Exam  Constitutional:       Appearance: She is well-developed. HENT:      Head: Normocephalic and atraumatic.       Right Ear: External ear normal.      Left Ear: External ear normal.      Nose: Nose normal.   Eyes:      Conjunctiva/sclera: Conjunctivae normal.      Pupils: Pupils are equal, round, and reactive to light. Cardiovascular:      Rate and Rhythm: Normal rate and regular rhythm. Heart sounds: Normal heart sounds. Pulmonary:      Effort: Pulmonary effort is normal.      Breath sounds: Normal breath sounds. Musculoskeletal:         General: Normal range of motion. Cervical back: Normal range of motion and neck supple. Skin:     General: Skin is warm and dry. Neurological:      Mental Status: She is alert and oriented to person, place, and time. Deep Tendon Reflexes: Reflexes are normal and symmetric. Psychiatric:         Mood and Affect: Mood normal.         Behavior: Behavior normal.         Thought Content:  Thought content normal.         Judgment: Judgment normal.      Comments: Anxiety and depression stable

## 2023-10-18 NOTE — PATIENT INSTRUCTIONS
Doing well with BP and anxiety depression on medication. Continue with currents meds and rec recheck in 6 months. Eat healthy and exercise as tolerated. Take Vitamin D3 as directed and call if any problems. Patient is getting a 2nd opinion for left knee pain.

## 2023-10-19 ENCOUNTER — OFFICE VISIT (OUTPATIENT)
Dept: PHYSICAL THERAPY | Facility: REHABILITATION | Age: 63
End: 2023-10-19
Payer: COMMERCIAL

## 2023-10-19 DIAGNOSIS — M76.32 ILIOTIBIAL BAND SYNDROME OF LEFT SIDE: ICD-10-CM

## 2023-10-19 DIAGNOSIS — M25.562 ACUTE PAIN OF LEFT KNEE: Primary | ICD-10-CM

## 2023-10-19 PROCEDURE — 97140 MANUAL THERAPY 1/> REGIONS: CPT | Performed by: PHYSICAL THERAPIST

## 2023-10-19 PROCEDURE — 97110 THERAPEUTIC EXERCISES: CPT | Performed by: PHYSICAL THERAPIST

## 2023-10-19 NOTE — PROGRESS NOTES
Daily Note     Today's date: 10/19/2023  Patient name: Partha Peter  : 1960  MRN: [id number]  Referring provider: Ayaka Gu DO  Dx:   Encounter Diagnosis     ICD-10-CM    1. Acute pain of left knee  M25.562       2. Iliotibial band syndrome of left side  M76.32                      Subjective: Pt reports having 2nd opinion today and is considering a menisectomy. Objective: See treatment diary below     Precautions: None        Manual  9/1/23 9/8/23 9/15/23 10/12/23  10/19/23    grade III tibial glides  10 mins 10 mins       10 mins    RE     30 mins 30 mins       sitting grade II distraction MWM flex/ext  10 mins   10 mins      10 mins    patellar mobs all direct 10 mins   10 mins      10 mins                        Neuro Re-Ed             bridges 5"x10       5"x10       prone hip ext        3"x10 ea      mini squats        3"x10       SLS foam       15"x3                                                              Therex             QS             SLR  3"2x10  3"2x10 reviewed        heel slides   reviewed reviewed        SAQ  2# 2x10             rec  bike  8 mins  8 mins  8 mins    8 mins     hip add isomet                                                        Gait Training                                 Modalities              game ready  deferred  deferred  np                                          Assessment: Pt demo's less edema present today and able to perform knee flexion with less pain, pt does have more restriction into extension with positive pain in med popliteal space. Pt's ROM cont to be affected into extension post mobilizations. Pt encouraged to cont HEP and exercises to assist with strengthening prior to surgery. Plan: Hold PT at this time, will evaluate post surgery if appropriate.

## 2023-12-11 ENCOUNTER — TELEPHONE (OUTPATIENT)
Dept: FAMILY MEDICINE CLINIC | Facility: CLINIC | Age: 63
End: 2023-12-11

## 2023-12-11 DIAGNOSIS — G47.30 SLEEP APNEA, UNSPECIFIED TYPE: Primary | ICD-10-CM

## 2023-12-11 NOTE — TELEPHONE ENCOUNTER
Pt called and left message that she needs a new order for CPAP supplies due to she now using [facility] and is no longer using [facility]. Can you please advise?  Thanks

## 2024-01-02 ENCOUNTER — EVALUATION (OUTPATIENT)
Dept: PHYSICAL THERAPY | Facility: REHABILITATION | Age: 64
End: 2024-01-02
Payer: COMMERCIAL

## 2024-01-02 DIAGNOSIS — Z98.890 S/P ARTHROSCOPIC PARTIAL MEDIAL MENISCECTOMY: ICD-10-CM

## 2024-01-02 DIAGNOSIS — M25.562 ACUTE PAIN OF LEFT KNEE: Primary | ICD-10-CM

## 2024-01-02 PROCEDURE — 97161 PT EVAL LOW COMPLEX 20 MIN: CPT | Performed by: PHYSICAL THERAPIST

## 2024-01-02 PROCEDURE — 97110 THERAPEUTIC EXERCISES: CPT | Performed by: PHYSICAL THERAPIST

## 2024-01-02 NOTE — PROGRESS NOTES
PT Evaluation     Today's date: 2024  Patient name: Loretta Aguero  : 1960  MRN: [id number]  Referring provider: Huseyin Khan MD  Dx:   Encounter Diagnosis     ICD-10-CM    1. Acute pain of left knee  M25.562       2. S/P arthroscopic partial medial meniscectomy  Z98.890                      Assessment  Assessment details: Loretta Aguero is a 63 y.o. female referred to outpt PT s/p left menisectomy.  Pt presents with decrease in knee ROM, decrease in left LE strength, and positive edema with tenderness.  Pt funct is limited with decrease in tolerance to ambulation, negotiation of stairs, and transfers.  Pt would benefit from skilled PT to address these limitations and max funct.     Impairments: abnormal gait, abnormal or restricted ROM, impaired physical strength and pain with function    Goals  Funct 1. Normal ambulation without restrictions x4 weeks.  2.  Negotiation stairs reciprocally x4 weeks.  Impairment 1. Increase ROM by 25% x4 weeks  2. Decrease pain by 25 % x4 weeks  3. Increase strength by 1/2 grade x4 weeks.      Plan  Planned therapy interventions: home exercise program, therapeutic exercise, strengthening, stretching, patient education, postural training, neuromuscular re-education and manual therapy  Frequency: 2x week  Duration in weeks: 4        Subjective Evaluation    History of Present Illness  Mechanism of injury: Pt had left menisectomy performed on 23.  Pt amb into facility without AD, does demo slight limp present.  Pt notes improvement in sx's immediately p.o.  Pt is currently not walking her dog out of precaution.  Pt is scheduled to f/u with ortho on 24.  Pt denies use of medication for pain, only ibuprofen prn and in the evenings (nothing for 2 days).  Pt is negotiating stairs reciprocally at home, but slower and non-reciprocal when descending.  Pt denies sleep disturbances since surgery.  Pt is currently out of work the next 2 weeks due to surgery and working  "from home as needed.  Pt is using ice a few times per day to assist with edema and pain. Pt's main goal is to return to walking her dog, twice per day, up 1 hour each time.    Patient Goals  Patient goals for therapy: decreased pain and increased strength    Pain  Current pain ratin  At worst pain ratin          Objective     Passive Range of Motion   Left Knee   Flexion: 110 degrees   Extension: 0 degrees     Additional Passive Range of Motion Details  Pt has positive steri-strips present over incision.  Pt has min TTP along incisions and positive edema.  Pt has discomfort at endrange.      Strength/Myotome Testing     Left Knee   Flexion: 5  Extension: 4  Quadriceps contraction: fair           Precautions: None        Manual  24        PROM L knee                                                                           Neuro Re-Ed             Stand HR/TR          bridges           SL hip abd                                                                Therex            QS 5\"x10          heel slides 5\"x10             SLR 3\"x10             rec bike                                                                                    Gait Training                                 Modalities                                                  "

## 2024-01-02 NOTE — LETTER
2024    Huseyin Khan MD  [address] [facility] [address]    Patient: Loretta Aguero   YOB: 1960   Date of Visit: 2024     Encounter Diagnosis     ICD-10-CM    1. Acute pain of left knee  M25.562       2. S/P arthroscopic partial medial meniscectomy  Z98.890           Dear Dr. Khan:    Thank you for your recent referral of Loretta Aguero. Please review the attached evaluation summary from Loretta's recent visit.     Please verify that you agree with the plan of care by signing the attached order.     If you have any questions or concerns, please do not hesitate to call.     I sincerely appreciate the opportunity to share in the care of one of your patients and hope to have another opportunity to work with you in the near future.       Sincerely,    Cyndi Duffy, PT      Referring Provider:      I certify that I have read the below Plan of Care and certify the need for these services furnished under this plan of treatment while under my care.                    Huseyin Khan MD  [address] [facility] [address]  Via Fax: [phone]          PT Evaluation     Today's date: 2024  Patient name: Loretta Aguero  : 1960  MRN: [id number]  Referring provider: Huseyin Khan MD  Dx:   Encounter Diagnosis     ICD-10-CM    1. Acute pain of left knee  M25.562       2. S/P arthroscopic partial medial meniscectomy  Z98.890                      Assessment  Assessment details: Loretta Aguero is a 63 y.o. female referred to outpt PT s/p left menisectomy.  Pt presents with decrease in knee ROM, decrease in left LE strength, and positive edema with tenderness.  Pt funct is limited with decrease in tolerance to ambulation, negotiation of stairs, and transfers.  Pt would benefit from skilled PT to address these limitations and max funct.     Impairments: abnormal gait, abnormal or restricted ROM, impaired physical strength and pain with  function    Goals  Funct 1. Normal ambulation without restrictions x4 weeks.  2.  Negotiation stairs reciprocally x4 weeks.  Impairment 1. Increase ROM by 25% x4 weeks  2. Decrease pain by 25 % x4 weeks  3. Increase strength by 1/2 grade x4 weeks.      Plan  Planned therapy interventions: home exercise program, therapeutic exercise, strengthening, stretching, patient education, postural training, neuromuscular re-education and manual therapy  Frequency: 2x week  Duration in weeks: 4        Subjective Evaluation    History of Present Illness  Mechanism of injury: Pt had left menisectomy performed on 23.  Pt amb into facility without AD, does demo slight limp present.  Pt notes improvement in sx's immediately p.o.  Pt is currently not walking her dog out of precaution.  Pt is scheduled to f/u with ortho on 24.  Pt denies use of medication for pain, only ibuprofen prn and in the evenings (nothing for 2 days).  Pt is negotiating stairs reciprocally at home, but slower and non-reciprocal when descending.  Pt denies sleep disturbances since surgery.  Pt is currently out of work the next 2 weeks due to surgery and working from home as needed.  Pt is using ice a few times per day to assist with edema and pain. Pt's main goal is to return to walking her dog, twice per day, up 1 hour each time.    Patient Goals  Patient goals for therapy: decreased pain and increased strength    Pain  Current pain ratin  At worst pain ratin          Objective     Passive Range of Motion   Left Knee   Flexion: 110 degrees   Extension: 0 degrees     Additional Passive Range of Motion Details  Pt has positive steri-strips present over incision.  Pt has min TTP along incisions and positive edema.  Pt has discomfort at endrange.      Strength/Myotome Testing     Left Knee   Flexion: 5  Extension: 4  Quadriceps contraction: fair           Precautions: None        Manual  24        PROM L knee                                     "                                       Neuro Re-Ed             Stand HR/TR          bridges           SL hip abd                                                                Therex            QS 5\"x10          heel slides 5\"x10             SLR 3\"x10             rec bike                                                                                    Gait Training                                 Modalities                                                                  "

## 2024-01-04 ENCOUNTER — OFFICE VISIT (OUTPATIENT)
Dept: PHYSICAL THERAPY | Facility: REHABILITATION | Age: 64
End: 2024-01-04
Payer: COMMERCIAL

## 2024-01-04 DIAGNOSIS — M25.562 ACUTE PAIN OF LEFT KNEE: Primary | ICD-10-CM

## 2024-01-04 DIAGNOSIS — Z98.890 S/P ARTHROSCOPIC PARTIAL MEDIAL MENISCECTOMY: ICD-10-CM

## 2024-01-04 PROCEDURE — 97112 NEUROMUSCULAR REEDUCATION: CPT | Performed by: PHYSICAL THERAPIST

## 2024-01-04 PROCEDURE — 97140 MANUAL THERAPY 1/> REGIONS: CPT | Performed by: PHYSICAL THERAPIST

## 2024-01-04 PROCEDURE — 97110 THERAPEUTIC EXERCISES: CPT | Performed by: PHYSICAL THERAPIST

## 2024-01-04 NOTE — PROGRESS NOTES
"Daily Note     Today's date: 2024  Patient name: Loretta Aguero  : 1960  MRN: [id number]  Referring provider: Huseyin Khan MD  Dx:   Encounter Diagnosis     ICD-10-CM    1. Acute pain of left knee  M25.562       2. S/P arthroscopic partial medial meniscectomy  Z98.890                      Subjective: Pt reports that she went for a walk with her dog up to 10 mins which she felt caused discomfort secondary to dog walking at faster speeds.  Pt also notes feeling good with HEP and attempting to perform more activity, which her knee became achy and she had to ice.       Objective: See treatment diary below     Precautions: None        Manual  24          PROM L knee    10 mins                                                                        Neuro Re-Ed             Stand HR/TR             bridges             SL hip abd                                                                                   Therex             QS 5\"x10  5\"x10           heel slides 5\"x10   5\"x10           SLR 3\"x10   3\"2x5           rec bike    5 mins           hip add isometric    5\"x10                                                                   Gait Training                                 Modalities                                                                 Assessment: Pt demo's improved knee ROM and flexibility with less pain and sx's.  Pt is doing HEP well and encouraged to cont to assist with ROM and quad strength.  Pt educated in importance of letting pain be her guide and to cont to ice as needed for pain and edema control.        Plan: Continue per plan of care.      "

## 2024-01-09 ENCOUNTER — OFFICE VISIT (OUTPATIENT)
Dept: PHYSICAL THERAPY | Facility: REHABILITATION | Age: 64
End: 2024-01-09
Payer: COMMERCIAL

## 2024-01-09 DIAGNOSIS — Z98.890 S/P ARTHROSCOPIC PARTIAL MEDIAL MENISCECTOMY: ICD-10-CM

## 2024-01-09 DIAGNOSIS — M25.562 ACUTE PAIN OF LEFT KNEE: Primary | ICD-10-CM

## 2024-01-09 PROCEDURE — 97112 NEUROMUSCULAR REEDUCATION: CPT | Performed by: PHYSICAL THERAPIST

## 2024-01-09 PROCEDURE — 97140 MANUAL THERAPY 1/> REGIONS: CPT | Performed by: PHYSICAL THERAPIST

## 2024-01-09 PROCEDURE — 97110 THERAPEUTIC EXERCISES: CPT | Performed by: PHYSICAL THERAPIST

## 2024-01-09 NOTE — PROGRESS NOTES
"Daily Note     Today's date: 2024  Patient name: Loretta Aguero  : 1960  MRN: [id number]  Referring provider: Huseyin Khan MD  Dx:   Encounter Diagnosis     ICD-10-CM    1. Acute pain of left knee  M25.562       2. S/P arthroscopic partial medial meniscectomy  Z98.890                      Subjective: Pt reports that she is doing well with min pain and sx's.  Pt had f/u with ortho yesterday and cleared from their services. Pt did note that she had discomfort again after walking her dog (1 hour).      Objective: See treatment diary below     Precautions: None        Manual  24        PROM L knee    10 mins   10 mins                                                                      Neuro Re-Ed             Single leg HR L   3\"x10        bridges      3\"2x10        SL hip abd      3\"x10 TrA        SLS                                                                     Therex             QS 5\"x10  5\"x10   5\"x20         heel slides 5\"x10   5\"x10   hep        SLR 3\"x10   3\"2x5   3\"2x8         rec bike    5 mins   7 mins         hip add isometric    5\"x10   5\"x20                                                                 Gait Training                                 Modalities                                                                 Assessment: Pt does well with progression of strengthening program today as she demo's normal funct knee flex/ext ROM without pain.  Good challenge with SLS on foam and encouraged to practice as part of HEP.  Pt encouraged to ice as needed to assist with p.o. soreness and edema.     Plan: Continue per plan of care.      "

## 2024-01-11 ENCOUNTER — OFFICE VISIT (OUTPATIENT)
Dept: PHYSICAL THERAPY | Facility: REHABILITATION | Age: 64
End: 2024-01-11
Payer: COMMERCIAL

## 2024-01-11 DIAGNOSIS — M25.562 ACUTE PAIN OF LEFT KNEE: Primary | ICD-10-CM

## 2024-01-11 DIAGNOSIS — Z98.890 S/P ARTHROSCOPIC PARTIAL MEDIAL MENISCECTOMY: ICD-10-CM

## 2024-01-11 PROCEDURE — 97112 NEUROMUSCULAR REEDUCATION: CPT | Performed by: PHYSICAL THERAPIST

## 2024-01-11 PROCEDURE — 97110 THERAPEUTIC EXERCISES: CPT | Performed by: PHYSICAL THERAPIST

## 2024-01-11 PROCEDURE — 97140 MANUAL THERAPY 1/> REGIONS: CPT | Performed by: PHYSICAL THERAPIST

## 2024-01-11 NOTE — PROGRESS NOTES
"Daily Note     Today's date: 2024  Patient name: Loretta Aguero  : 1960  MRN: [id number]  Referring provider: Huseyin Khan MD  Dx:   Encounter Diagnosis     ICD-10-CM    1. Acute pain of left knee  M25.562       2. S/P arthroscopic partial medial meniscectomy  Z98.890                      Subjective: Pt was able to walk her dog for 30 mins since last visit and denies pain, noting that she feels ready to walk her dog earlier next week.        Objective: See treatment diary below     Precautions: None        Manual  24      PROM L knee    10 mins   10 mins  8 mins                                                                    Neuro Re-Ed             Single leg HR L   3\"x10   3\"x10      bridges      3\"2x10   3\"2x10      SL hip abd      3\"x10 TrA  3\"x20 TrA      SLS        foam 15'x2       mini squats       3\"x10                                                Therex             QS 5\"x10  5\"x10   5\"x20   hep      heel slides 5\"x10   5\"x10   hep        SLR 3\"x10   3\"2x5   3\"2x8   3\"2x10       rec bike    5 mins   7 mins   8 mins       hip add isometric    5\"x10   5\"x20         leg press        NV                                                Gait Training                                 Modalities                                                                 Assessment: Pt does well with progression of exercises without pain.  Pt demo's normal funct knee ROM and able to complete program as appropriate.      Plan: Continue per plan of care.      "

## 2024-01-16 ENCOUNTER — APPOINTMENT (OUTPATIENT)
Dept: PHYSICAL THERAPY | Facility: REHABILITATION | Age: 64
End: 2024-01-16
Payer: COMMERCIAL

## 2024-01-16 ENCOUNTER — TELEPHONE (OUTPATIENT)
Age: 64
End: 2024-01-16

## 2024-01-16 NOTE — TELEPHONE ENCOUNTER
Patient called refill line because when she spoke to pharmacy about her sleep apnea supplies, they stated that they had NO RECORD of the order. I did see the order from 12/11/2023. This is the information that they had given her to pass along to us.    Please fax over:    Patient demographics    CPAP intake documents    Script has to be written for: [facility]    It is being fax to [facility]  Fax: [phone]

## 2024-01-18 ENCOUNTER — OFFICE VISIT (OUTPATIENT)
Dept: PHYSICAL THERAPY | Facility: REHABILITATION | Age: 64
End: 2024-01-18
Payer: COMMERCIAL

## 2024-01-18 DIAGNOSIS — Z98.890 S/P ARTHROSCOPIC PARTIAL MEDIAL MENISCECTOMY: ICD-10-CM

## 2024-01-18 DIAGNOSIS — M25.562 ACUTE PAIN OF LEFT KNEE: Primary | ICD-10-CM

## 2024-01-18 PROCEDURE — 97140 MANUAL THERAPY 1/> REGIONS: CPT | Performed by: PHYSICAL THERAPIST

## 2024-01-18 PROCEDURE — 97112 NEUROMUSCULAR REEDUCATION: CPT | Performed by: PHYSICAL THERAPIST

## 2024-01-18 PROCEDURE — 97110 THERAPEUTIC EXERCISES: CPT | Performed by: PHYSICAL THERAPIST

## 2024-01-18 NOTE — PROGRESS NOTES
"Daily Note     Today's date: 2024  Patient name: Loretta Aguero  : 1960  MRN: [id number]  Referring provider: Huseyin Khan MD  Dx:   Encounter Diagnosis     ICD-10-CM    1. Acute pain of left knee  M25.562       2. S/P arthroscopic partial medial meniscectomy  Z98.890                      Subjective: Pt notes cont to progress well with min pain and sx's.  Pt does note some discomfort with stepping down directly along patella on left.       Objective: See treatment diary below     Precautions: None        Manual  24    PROM L knee    10 mins   10 mins  8 mins   10 mins                                                                  Neuro Re-Ed             Single leg HR L   3\"x10   3\"x10   3\"x10    bridges      3\"2x10   3\"2x10   3\"2x10    SL hip abd      3\"x10 TrA  3\"x20 TrA  3\"x20 TrA    SLS        foam 15'x2   foam 15\"x2     mini squats       3\"x10       hip ext prone         3\"x10                                Therex             QS 5\"x10  5\"x10   5\"x20   hep      heel slides 5\"x10   5\"x10   hep        SLR 3\"x10   3\"2x5   3\"2x8   3\"2x10   3\"2x10     rec bike    5 mins   7 mins   8 mins   8 mins     hip add isometric    5\"x10   5\"x20         leg press        NV  45# 2x10                                               Gait Training              lateral step down         6 inch x10          Modalities                                                                 Assessment: Pt does well with normal funct knee flex/ext ROM without pain.  Added prone hip flexor/quad stretch to assist with soft tissue flexibility.  Pt focused on strengthening at today's visit to improve left LE control.      Plan: Continue per plan of care.      "

## 2024-01-22 DIAGNOSIS — I10 ESSENTIAL HYPERTENSION: ICD-10-CM

## 2024-01-23 ENCOUNTER — OFFICE VISIT (OUTPATIENT)
Dept: PHYSICAL THERAPY | Facility: REHABILITATION | Age: 64
End: 2024-01-23
Payer: COMMERCIAL

## 2024-01-23 DIAGNOSIS — Z98.890 S/P ARTHROSCOPIC PARTIAL MEDIAL MENISCECTOMY: ICD-10-CM

## 2024-01-23 DIAGNOSIS — Z00.00 HEALTH CARE MAINTENANCE: ICD-10-CM

## 2024-01-23 DIAGNOSIS — E55.9 VITAMIN D DEFICIENCY: ICD-10-CM

## 2024-01-23 DIAGNOSIS — M25.562 ACUTE PAIN OF LEFT KNEE: Primary | ICD-10-CM

## 2024-01-23 DIAGNOSIS — I10 PRIMARY HYPERTENSION: Primary | ICD-10-CM

## 2024-01-23 PROCEDURE — 97110 THERAPEUTIC EXERCISES: CPT | Performed by: PHYSICAL THERAPIST

## 2024-01-23 PROCEDURE — 97112 NEUROMUSCULAR REEDUCATION: CPT | Performed by: PHYSICAL THERAPIST

## 2024-01-23 PROCEDURE — 97140 MANUAL THERAPY 1/> REGIONS: CPT | Performed by: PHYSICAL THERAPIST

## 2024-01-23 RX ORDER — CANDESARTAN 16 MG/1
TABLET ORAL
Qty: 90 TABLET | Refills: 0 | Status: SHIPPED | OUTPATIENT
Start: 2024-01-23

## 2024-01-23 NOTE — PROGRESS NOTES
"Daily Note     Today's date: 2024  Patient name: Loretta Aguero  : 1960  MRN: [id number]  Referring provider: Huseyin Khan MD  Dx:   Encounter Diagnosis     ICD-10-CM    1. Acute pain of left knee  M25.562       2. S/P arthroscopic partial medial meniscectomy  Z98.890                      Subjective: Pt is doing well with returning to walking her dog with less discomfort, but cont to be limited at full speed and duration.  Pt denies pain.       Objective: See treatment diary below     Precautions: None        Manual  24    PROM L knee 8 mins prone knee flexion  10 mins   10 mins  8 mins   10 mins                                                                  Neuro Re-Ed             Single leg HR L hep  3\"x10   3\"x10   3\"x10    bridges  5\"x20     3\"2x10   3\"2x10   3\"2x10    SL hip abd  3\"x20     3\"x10 TrA  3\"x20 TrA  3\"x20 TrA    SLS  foam 20\"x2 ea      foam 15'x2   foam 15\"x2     mini squats  3\"2x10      3\"x10       hip ext prone  3\"x20        3\"x10     EC NS foam 30\"x1                          Therex            QS   5\"x10   5\"x20   hep      heel slides   5\"x10   hep        SLR 3\"2x10   3\"2x5   3\"2x8   3\"2x10   3\"2x10     rec bike 8 mins   5 mins   7 mins   8 mins   8 mins             leg press  55# 2x10       NV  45# 2x10                                               Gait Training              lateral step down independent        6 inch x10          Modalities                                                                 Assessment: Pt cont to progress well without increase in strength and pain and demo's improved stability overall.  Pt does have positive LOB during EC/foam surface and encouraged to cont to challenge her balance to assist with strengthening her vestibular system and LE.     Plan: Continue per plan of care.      "

## 2024-01-25 ENCOUNTER — OFFICE VISIT (OUTPATIENT)
Dept: PHYSICAL THERAPY | Facility: REHABILITATION | Age: 64
End: 2024-01-25
Payer: COMMERCIAL

## 2024-01-25 DIAGNOSIS — Z98.890 S/P ARTHROSCOPIC PARTIAL MEDIAL MENISCECTOMY: ICD-10-CM

## 2024-01-25 DIAGNOSIS — M25.562 ACUTE PAIN OF LEFT KNEE: Primary | ICD-10-CM

## 2024-01-25 PROCEDURE — 97112 NEUROMUSCULAR REEDUCATION: CPT | Performed by: PHYSICAL THERAPIST

## 2024-01-25 PROCEDURE — 97110 THERAPEUTIC EXERCISES: CPT | Performed by: PHYSICAL THERAPIST

## 2024-01-25 NOTE — TELEPHONE ENCOUNTER
Pt called again, stating that she left a message on the 16th and still has not received a call back in regards to her cpap supplies being faxed over to [facility]. Please fax to [facility] at fax number [phone] thank you.

## 2024-01-25 NOTE — PROGRESS NOTES
"Daily Note     Today's date: 2024  Patient name: Loretta Aguero  : 1960  MRN: [id number]  Referring provider: Huseyin Khan MD  Dx:   Encounter Diagnosis     ICD-10-CM    1. Acute pain of left knee  M25.562       2. S/P arthroscopic partial medial meniscectomy  Z98.890                      Subjective: Pt notes cont to do well with increase in walking her dog.        Objective: See treatment diary below     Precautions: None        Manual  24    PROM L knee 8 mins prone knee flexion  DC  10 mins  8 mins   10 mins                                                                  Neuro Re-Ed             Single leg HR L hep  3\"x10   3\"x10   3\"x10    bridges  5\"x20   5\"2x15   3\"2x10   3\"2x10   3\"2x10    SL hip abd  3\"x20   3\"2x15   3\"x10 TrA  3\"x20 TrA  3\"x20 TrA    SLS  foam 20\"x2 ea  foam 20\"x2 ea    foam 15'x2   foam 15\"x2     mini squats  3\"2x10  3\"x10    3\"x10       hip ext prone  3\"x20  3\"2x15      3\"x10     EC NS foam 30\"x1  30\"x2           side stepping   Gtb 10'x2 ea         Therex                             SLR 3\"2x10   3\"2x15   3\"2x8   3\"2x10   3\"2x10     rec bike 8 mins  8 mins   7 mins   8 mins   8 mins             leg press  55# 2x10   65# x20     NV  45# 2x10                                               Gait Training              lateral step down independent        6 inch x10          Modalities                                                                 Assessment: Pt progressing well with strength to assist with support of left knee.  Pt denies pain and demo's normal knee flexibility and ROM.  Pt progressing well towards goals.     Plan: Continue per plan of care.      "

## 2024-01-30 ENCOUNTER — OFFICE VISIT (OUTPATIENT)
Dept: PHYSICAL THERAPY | Facility: REHABILITATION | Age: 64
End: 2024-01-30
Payer: COMMERCIAL

## 2024-01-30 DIAGNOSIS — Z98.890 S/P ARTHROSCOPIC PARTIAL MEDIAL MENISCECTOMY: ICD-10-CM

## 2024-01-30 DIAGNOSIS — M25.562 ACUTE PAIN OF LEFT KNEE: Primary | ICD-10-CM

## 2024-01-30 PROCEDURE — 97112 NEUROMUSCULAR REEDUCATION: CPT | Performed by: PHYSICAL THERAPIST

## 2024-01-30 PROCEDURE — 97110 THERAPEUTIC EXERCISES: CPT | Performed by: PHYSICAL THERAPIST

## 2024-01-30 NOTE — PROGRESS NOTES
"Daily Note     Today's date: 2024  Patient name: Loretta Aguero  : 1960  MRN: [id number]  Referring provider: Huseyin Khan MD  Dx:   Encounter Diagnosis     ICD-10-CM    1. Acute pain of left knee  M25.562       2. S/P arthroscopic partial medial meniscectomy  Z98.890                      Subjective: Pt notes soreness occurring over the last 2 days after increasing her distance and speed with walking her dog.  Pt denies knee joint pain.        Objective: See treatment diary below     Precautions: None        Manual  24    PROM L knee 8 mins prone knee flexion  DC  8 mins   10 mins                                                               Neuro Re-Ed             Single leg HR L hep    3\"x10   3\"x10    bridges  5\"x20   5\"2x15  hep  3\"2x10   3\"2x10    SL hip abd  3\"x20   3\"2x15  3\"2x15   3\"x20 TrA  3\"x20 TrA    SLS  foam 20\"x2 ea  foam 20\"x2 ea Foam 20\"x2 ea  foam 15'x2   foam 15\"x2     mini squats  3\"2x10  3\"x10  3\"x20 3\"x10       hip ext prone  3\"x20  3\"2x15     3\"x10     EC NS foam 30\"x1  30\"x2   30\"x2         side stepping   Gtb 10'x2 ea  gtb 20'x1 ea       Therex                             SLR 3\"2x10   3\"2x15  hep  3\"2x10   3\"2x10     rec bike 8 mins  8 mins  8 mins   8 mins   8 mins             leg press L   55# 2x10   65# x20   65#2x15  NV  45# 2x10                                               Gait Training             Reciprocal flight step up/down    X1 ea           Modalities                                                                 Assessment: Pt demo's normal reciprocal pattern with negotiation of stairs with good conrol and denies pain.  Pt is doing well with balance and proprioception again without pain.      Plan: Continue per plan of care. RE next visit.       "

## 2024-02-01 ENCOUNTER — EVALUATION (OUTPATIENT)
Dept: PHYSICAL THERAPY | Facility: REHABILITATION | Age: 64
End: 2024-02-01
Payer: COMMERCIAL

## 2024-02-01 DIAGNOSIS — Z98.890 S/P ARTHROSCOPIC PARTIAL MEDIAL MENISCECTOMY: ICD-10-CM

## 2024-02-01 DIAGNOSIS — M25.562 ACUTE PAIN OF LEFT KNEE: Primary | ICD-10-CM

## 2024-02-01 PROCEDURE — 97140 MANUAL THERAPY 1/> REGIONS: CPT | Performed by: PHYSICAL THERAPIST

## 2024-02-01 PROCEDURE — 97110 THERAPEUTIC EXERCISES: CPT | Performed by: PHYSICAL THERAPIST

## 2024-02-01 NOTE — PROGRESS NOTES
PT Discharge    Today's date: 2024  Patient name: Loretta Aguero  : 1960  MRN: [id number]  Referring provider: Huseyin Khan MD  Dx:   Encounter Diagnosis     ICD-10-CM    1. Acute pain of left knee  M25.562       2. S/P arthroscopic partial medial meniscectomy  Z98.890                      Assessment  Assessment details: Pt has met all funct and impairment goals with PT.  Pt is Dc'd from PT to cont with HEP.     Goals  Funct 1. Normal ambulation without restrictions x4 weeks.-met  2.  Negotiation stairs reciprocally x4 weeks.-met  Impairment 1. Increase ROM by 25% x4 weeks-met  2. Decrease pain by 25 % x4 weeks-met  3. Increase strength by 1/2 grade x4 weeks.-met          Subjective Evaluation    History of Present Illness  Mechanism of injury: Pt notes that she is doing well with returning to walking with her dog without increase in pain, but does report soreness.  Pt is able to negotiate stairs reciprocally without restrictions.  Pt is able to  single leg stance without LOB and good control without increase in pain.  Pt denies use of medication and using ice as needed for pain control, especially after walking prolonged with her dog.  Pt has returned to work related activities without restrictions.   Patient Goals  Patient goals for therapy: decreased pain and increased strength  Patient goal: -met  Pain  Current pain ratin  At worst pain ratin          Objective     Passive Range of Motion   Left Knee   Flexion: 125 degrees   Extension: 0 degrees     Strength/Myotome Testing     Left Hip   Planes of Motion   Flexion: 5  Abduction: 5    Left Knee   Flexion: 5  Extension: 5  Quadriceps contraction: good    Additional Strength Details  SLR performed without quad lag.      Precautions: None        Manual  24    PROM L knee 8 mins prone knee flexion  DC    10 mins     RE       30 mins                                                      Neuro Re-Ed      "        Single leg HR L hep       3\"x10    bridges  5\"x20   5\"2x15  hep   3\"2x10    SL hip abd  3\"x20   3\"2x15  3\"2x15  reviewed  3\"x20 TrA    SLS  foam 20\"x2 ea  foam 20\"x2 ea Foam 20\"x2 ea reviewed  foam 15\"x2     mini squats  3\"2x10  3\"x10  3\"x20 reviewed      hip ext prone  3\"x20  3\"2x15     3\"x10     EC NS foam 30\"x1  30\"x2   30\"x2   reviewed      side stepping   Gtb 10'x2 ea  gtb 20'x1 ea  reviewed     Therex                                          SLR 3\"2x10   3\"2x15  hep   3\"2x10     rec bike 8 mins  8 mins  8 mins  8 mins   8 mins                  leg press L   55# 2x10   65# x20   65#2x15   45# 2x10                                               Gait Training             Reciprocal flight step up/down      X1 ea             Modalities                                                            "

## 2024-03-18 DIAGNOSIS — F41.9 ANXIETY: ICD-10-CM

## 2024-03-18 DIAGNOSIS — F32.A DEPRESSION, UNSPECIFIED DEPRESSION TYPE: ICD-10-CM

## 2024-03-19 RX ORDER — ESCITALOPRAM OXALATE 5 MG/1
5 TABLET ORAL DAILY
Qty: 30 TABLET | Refills: 5 | Status: SHIPPED | OUTPATIENT
Start: 2024-03-19

## 2024-03-20 ENCOUNTER — TELEPHONE (OUTPATIENT)
Age: 64
End: 2024-03-20

## 2024-03-20 ENCOUNTER — OFFICE VISIT (OUTPATIENT)
Dept: URGENT CARE | Facility: CLINIC | Age: 64
End: 2024-03-20
Payer: COMMERCIAL

## 2024-03-20 VITALS
SYSTOLIC BLOOD PRESSURE: 118 MMHG | BODY MASS INDEX: 31.7 KG/M2 | RESPIRATION RATE: 18 BRPM | OXYGEN SATURATION: 99 % | TEMPERATURE: 98.1 F | HEART RATE: 96 BPM | WEIGHT: 214 LBS | DIASTOLIC BLOOD PRESSURE: 86 MMHG | HEIGHT: 69 IN

## 2024-03-20 DIAGNOSIS — J02.9 ACUTE PHARYNGITIS, UNSPECIFIED ETIOLOGY: Primary | ICD-10-CM

## 2024-03-20 PROCEDURE — S9083 URGENT CARE CENTER GLOBAL: HCPCS

## 2024-03-20 PROCEDURE — G0383 LEV 4 HOSP TYPE B ED VISIT: HCPCS

## 2024-03-20 RX ORDER — PHENTERMINE HYDROCHLORIDE 37.5 MG/1
37.5 CAPSULE ORAL EVERY MORNING
COMMUNITY

## 2024-03-20 NOTE — PATIENT INSTRUCTIONS
Your symptoms are consistent with a viral illness.    For nasal/sinus congestion you can try steam, warm compresses, saline nasal spray, Neti pot, nasal steroid (Flonase, Nasocort), or nasal decongestant (Afrin - for 3 days only).    You can try a decongestant (Sudafed) if > 6 years of age and no history of high blood pressure.    For cough you can take an over-the-counter expectorant such as plain Robitussion or Mucinex. A spoonful of honey at bedtime may also be helpful.    For cold symptoms with high blood pressure take Coricidin cough/cold.     For sore throat you can use Cepacol lozenges, do warm salt water gargles, drink warm water with lemon or herbal teas, or use an over-the-counter throat spray (Chloraseptic).    You can take ibuprofen/Motrin and acetaminophen/Tylenol as needed for pain, fever, body aches. Do not take ibuprofen/Motrin/Advil if you have a history of heart disease, bleeding ulcers, or if you take blood thinners.     Drink plenty of fluids to stay hydrated.    Follow up with your PCP in 5-7 days for persistent symptoms.    Go to the ER if symptoms worsen.

## 2024-03-20 NOTE — TELEPHONE ENCOUNTER
Pt called for same day with URI symptoms, began Sunday and worsening since.  Feels like turning into bronchitis.  Due to no availability today, she will go to the [facility].

## 2024-03-20 NOTE — PROGRESS NOTES
[facility]        NAME: Loretta Aguero is a 63 y.o. female  : 1960    MRN: [id number]  DATE: 2024  TIME: 12:13 PM    Assessment and Plan   Acute pharyngitis, unspecified etiology [J02.9]  1. Acute pharyngitis, unspecified etiology            Rapid strep test offered due to subjective sore throat, patient refused. Requesting antibiotics for treatment of bronchitis. Patient is well-appearing, afebrile, and lungs CTA. No signs of bacterial infection on exam at time of today's visit. Patient states she plans to follow-up with her PCP.      Patient Instructions     Your symptoms are consistent with a viral illness.    For nasal/sinus congestion you can try steam, warm compresses, saline nasal spray, Neti pot, nasal steroid (Flonase, Nasocort), or nasal decongestant (Afrin - for 3 days only).    You can try a decongestant (Sudafed) if > 6 years of age and no history of high blood pressure.    For cough you can take an over-the-counter expectorant such as plain Robitussion or Mucinex. A spoonful of honey at bedtime may also be helpful.    For cold symptoms with high blood pressure take Coricidin cough/cold.     For sore throat you can use Cepacol lozenges, do warm salt water gargles, drink warm water with lemon or herbal teas, or use an over-the-counter throat spray (Chloraseptic).    You can take ibuprofen/Motrin and acetaminophen/Tylenol as needed for pain, fever, body aches. Do not take ibuprofen/Motrin/Advil if you have a history of heart disease, bleeding ulcers, or if you take blood thinners.     Drink plenty of fluids to stay hydrated.    Follow up with your PCP in 5-7 days for persistent symptoms.    Go to the ER if symptoms worsen.        Chief Complaint     Chief Complaint   Patient presents with    Sore Throat     X4-5 days, Denies any fever          History of Present Illness       63-year-old female presenting with a sore throat and nasal congestion x4-5 days.  States she  developed a cough 1-2 days ago.  Cough is loose in the mornings and becomes dry by the afternoon.  There is no associated wheezing or shortness of breath.  No known fevers.  No over-the-counter treatments tried PTA.    Sore Throat   This is a new problem. The current episode started in the past 7 days. The problem has been gradually worsening. Neither side of throat is experiencing more pain than the other. There has been no fever. The pain is at a severity of 10/10. Associated symptoms include congestion and coughing. Pertinent negatives include no abdominal pain, diarrhea, ear pain, headaches, shortness of breath or vomiting. She has had no exposure to strep or mono.       Review of Systems   Review of Systems   Constitutional:  Negative for chills and fever.   HENT:  Positive for congestion, rhinorrhea and sore throat. Negative for ear pain and sinus pressure.    Eyes:  Negative for discharge and redness.   Respiratory:  Positive for cough. Negative for chest tightness, shortness of breath and wheezing.    Cardiovascular:  Negative for chest pain and palpitations.   Gastrointestinal:  Negative for abdominal pain, diarrhea, nausea and vomiting.   Skin:  Negative for pallor and rash.   Neurological:  Negative for dizziness, light-headedness and headaches.         Current Medications       Current Outpatient Medications:     candesartan (ATACAND) 16 mg tablet, take 1 tablet by mouth once daily, Disp: 90 tablet, Rfl: 0    Cholecalciferol 125 MCG (5000 UT) capsule, Take 1 capsule by mouth daily, Disp: , Rfl:     escitalopram (LEXAPRO) 5 mg tablet, Take 1 tablet (5 mg total) by mouth daily, Disp: 30 tablet, Rfl: 5    Omega-3 Fatty Acids (FISH OIL PO), Take 2 g by mouth daily , Disp: , Rfl:     phentermine 37.5 MG capsule, Take 37.5 mg by mouth every morning Take 1/2 every morning, Disp: , Rfl:     tretinoin (REFISSA) 0.05 % cream, APPLY A PEA SIZED AMOUNT TO ENTIRE FACE  EVERY NIGHT AT BEDTIME  ONE HOUR AFTER WASHING  "FACE, Disp: , Rfl:     Current Allergies     Allergies as of 03/20/2024 - Reviewed 03/20/2024   Allergen Reaction Noted    Ampicillin Hives 07/10/2012            The following portions of the patient's history were reviewed and updated as appropriate: allergies, current medications, past family history, past medical history, past social history, past surgical history and problem list.     Past Medical History:   Diagnosis Date    Hypertension     Sleep apnea        Past Surgical History:   Procedure Laterality Date    ADENOIDECTOMY      BUNIONECTOMY      NASAL SEPTUM SURGERY      RHINOPLASTY      TONSILLECTOMY         Family History   Problem Relation Age of Onset    Diabetes Mother     Hypertension Mother     Lung cancer Father     Cancer Neg Hx     Heart disease Neg Hx     Thyroid disease Neg Hx     Stroke Neg Hx          Medications have been verified.        Objective   /86   Pulse 96   Temp 98.1 °F (36.7 °C)   Resp 18   Ht 5' 8.5\" (1.74 m)   Wt 97.1 kg (214 lb)   SpO2 99%   BMI 32.07 kg/m²        Physical Exam     Physical Exam  Vitals and nursing note reviewed.   Constitutional:       General: She is not in acute distress.     Appearance: She is not ill-appearing or diaphoretic.   HENT:      Head: Normocephalic and atraumatic.      Right Ear: Tympanic membrane, ear canal and external ear normal.      Left Ear: Tympanic membrane, ear canal and external ear normal.      Nose: Nose normal.      Mouth/Throat:      Mouth: Mucous membranes are moist.      Pharynx: Oropharynx is clear. No oropharyngeal exudate or posterior oropharyngeal erythema.   Eyes:      Conjunctiva/sclera: Conjunctivae normal.      Pupils: Pupils are equal, round, and reactive to light.   Cardiovascular:      Rate and Rhythm: Normal rate and regular rhythm.      Pulses: Normal pulses.      Heart sounds: Normal heart sounds.   Pulmonary:      Effort: Pulmonary effort is normal.      Breath sounds: Normal breath sounds. No wheezing, " rhonchi or rales.   Musculoskeletal:         General: Normal range of motion.      Cervical back: Normal range of motion and neck supple.   Lymphadenopathy:      Cervical: No cervical adenopathy.   Skin:     General: Skin is warm and dry.      Capillary Refill: Capillary refill takes less than 2 seconds.   Neurological:      Mental Status: She is alert and oriented to person, place, and time.

## 2024-04-03 ENCOUNTER — HOSPITAL ENCOUNTER (OUTPATIENT)
Dept: CT IMAGING | Facility: HOSPITAL | Age: 64
Discharge: HOME | End: 2024-04-03
Payer: COMMERCIAL

## 2024-04-03 DIAGNOSIS — I10 HYPERTENSION, UNSPECIFIED TYPE: ICD-10-CM

## 2024-04-03 DIAGNOSIS — R07.9 CHEST PAIN, UNSPECIFIED TYPE: ICD-10-CM

## 2024-04-03 DIAGNOSIS — E78.01 ESSENTIAL FAMILIAL HYPERCHOLESTEROLEMIA: ICD-10-CM

## 2024-04-03 PROCEDURE — 75571 CT HRT W/O DYE W/CA TEST: CPT

## 2024-04-10 ENCOUNTER — RA CDI HCC (OUTPATIENT)
Dept: OTHER | Facility: HOSPITAL | Age: 64
End: 2024-04-10

## 2024-04-10 NOTE — PROGRESS NOTES
HCC coding opportunities       Chart reviewed, no opportunity found: CHART REVIEWED, NO OPPORTUNITY FOUND      This is a reminder to address (resolve/update/assess) ALL HCC (risk adjustment) codes as found on active problem list for 2024 as patient scores reset to zero ILIANA.  Also, just a reminder to please review and assess all other chronic conditions for 2024  Patients Insurance        Commercial Insurance: [employer]

## 2024-04-17 ENCOUNTER — OFFICE VISIT (OUTPATIENT)
Dept: FAMILY MEDICINE CLINIC | Facility: CLINIC | Age: 64
End: 2024-04-17
Payer: COMMERCIAL

## 2024-04-17 VITALS
WEIGHT: 211 LBS | HEART RATE: 84 BPM | SYSTOLIC BLOOD PRESSURE: 132 MMHG | BODY MASS INDEX: 31.98 KG/M2 | HEIGHT: 68 IN | TEMPERATURE: 96.9 F | DIASTOLIC BLOOD PRESSURE: 80 MMHG | OXYGEN SATURATION: 98 %

## 2024-04-17 DIAGNOSIS — E78.5 HYPERLIPIDEMIA, UNSPECIFIED HYPERLIPIDEMIA TYPE: ICD-10-CM

## 2024-04-17 DIAGNOSIS — I10 PRIMARY HYPERTENSION: ICD-10-CM

## 2024-04-17 DIAGNOSIS — E55.9 VITAMIN D DEFICIENCY: ICD-10-CM

## 2024-04-17 DIAGNOSIS — F32.A DEPRESSION, UNSPECIFIED DEPRESSION TYPE: ICD-10-CM

## 2024-04-17 DIAGNOSIS — Z00.00 HEALTH CARE MAINTENANCE: Primary | ICD-10-CM

## 2024-04-17 DIAGNOSIS — F41.9 ANXIETY: ICD-10-CM

## 2024-04-17 PROCEDURE — 99396 PREV VISIT EST AGE 40-64: CPT | Performed by: FAMILY MEDICINE

## 2024-04-17 RX ORDER — BUPROPION HYDROCHLORIDE 150 MG/1
TABLET ORAL
COMMUNITY
Start: 2024-03-25 | End: 2024-04-17

## 2024-04-17 RX ORDER — ROSUVASTATIN CALCIUM 5 MG/1
5 TABLET, COATED ORAL DAILY
Qty: 30 TABLET | Refills: 5 | Status: SHIPPED | OUTPATIENT
Start: 2024-04-17

## 2024-04-17 RX ORDER — ROSUVASTATIN CALCIUM 5 MG/1
TABLET, COATED ORAL
COMMUNITY
Start: 2024-04-05 | End: 2024-04-17 | Stop reason: SDUPTHER

## 2024-04-17 NOTE — PATIENT INSTRUCTIONS
Start Crestor 5 mg daily as directed by Cardiology and f-up with cardiology in May 2024. Rec diet and exercise to get BMI lower than 25. Rec sunscreen and monitor for ticks. Rec labs as directed. Labs reviewed and other labs stable. CT coronary calcium score 237. See GYN and get mammograms as directed. Anxiety and depression - take med as directed and stress management encouraged. Low cholesterol diet encouraged with exercise.

## 2024-04-17 NOTE — PROGRESS NOTES
"Chief Complaint   Patient presents with    Physical Exam     There are no Patient Instructions on file for this visit.  Assessment/Plan:    No problem-specific Assessment & Plan notes found for this encounter.       Diagnoses and all orders for this visit:    Need for hepatitis C screening test    Screening for HIV (human immunodeficiency virus)    Encounter for screening mammogram for breast cancer    Other orders  -     buPROPion (WELLBUTRIN XL) 150 mg 24 hr tablet;  (Patient not taking: Reported on 4/17/2024)  -     rosuvastatin (CRESTOR) 5 mg tablet;  (Patient not taking: Reported on 4/17/2024)          Subjective:      Patient ID: Loretta Aguero is a 63 y.o. female.    Here for General PE. Patient is at [employer] as  for [employer]. Walks 2 hours every day. Gets at least 8 hours of sleep per night. Tries to eat healthy. Increased stress. Sees Gyn and gets mammograms. Hx of elevated . But hdl at 65. Colon screening is UTD and done Summer 2023 in July 2023. Uses sunscreen and monitors for ticks.         The following portions of the patient's history were reviewed and updated as appropriate: allergies, current medications, past family history, past medical history, past social history, past surgical history, and problem list.    Review of Systems   Constitutional: Negative.    HENT: Negative.     Eyes: Negative.    Respiratory: Negative.     Cardiovascular: Negative.    Gastrointestinal: Negative.    Endocrine: Negative.    Genitourinary: Negative.    Musculoskeletal: Negative.    Skin: Negative.    Allergic/Immunologic: Negative.    Neurological: Negative.    Hematological: Negative.    Psychiatric/Behavioral: Negative.           Objective:      /80   Pulse 84   Temp (!) 96.9 °F (36.1 °C) (Temporal)   Ht 5' 8\" (1.727 m)   Wt 95.7 kg (211 lb)   SpO2 98%   BMI 32.08 kg/m²          Physical Exam  Constitutional:       Appearance: She is well-developed. She is obese.   HENT:     "  Head: Normocephalic and atraumatic.      Right Ear: Tympanic membrane, ear canal and external ear normal.      Left Ear: Tympanic membrane, ear canal and external ear normal.      Nose: Nose normal.      Mouth/Throat:      Mouth: Mucous membranes are moist.   Eyes:      Conjunctiva/sclera: Conjunctivae normal.      Pupils: Pupils are equal, round, and reactive to light.   Cardiovascular:      Rate and Rhythm: Normal rate and regular rhythm.      Pulses: Normal pulses.      Heart sounds: Normal heart sounds.   Pulmonary:      Effort: Pulmonary effort is normal.      Breath sounds: Normal breath sounds.   Abdominal:      General: Abdomen is flat. Bowel sounds are normal.      Palpations: Abdomen is soft.   Musculoskeletal:         General: Normal range of motion.      Cervical back: Normal range of motion and neck supple.   Skin:     General: Skin is warm and dry.      Capillary Refill: Capillary refill takes less than 2 seconds.   Neurological:      General: No focal deficit present.      Mental Status: She is alert and oriented to person, place, and time. Mental status is at baseline.      Deep Tendon Reflexes: Reflexes are normal and symmetric.   Psychiatric:         Mood and Affect: Mood normal.         Behavior: Behavior normal.         Thought Content: Thought content normal.         Judgment: Judgment normal.

## 2024-04-24 DIAGNOSIS — I10 ESSENTIAL HYPERTENSION: ICD-10-CM

## 2024-04-25 RX ORDER — CANDESARTAN 16 MG/1
TABLET ORAL
Qty: 90 TABLET | Refills: 1 | Status: SHIPPED | OUTPATIENT
Start: 2024-04-25

## 2024-05-29 ENCOUNTER — TELEPHONE (OUTPATIENT)
Age: 64
End: 2024-05-29

## 2024-05-29 NOTE — TELEPHONE ENCOUNTER
Naye from [facility] [employer] called in requesting for the following 3 documents to be faxed to her electronically signed by Dr. Reese.    1) Chart note for using CPAP machine and benefiting.  2) Copy of Sleep Study  3) Prescription for CPAP machine with pressure setting.    Tel @ [phone]  Fax @ [phone]    Thanks

## 2024-05-29 NOTE — TELEPHONE ENCOUNTER
I called and left the patient a message to give the office a call back, I need to know her current pressure setting and when was her last sleep study.

## 2024-05-30 ENCOUNTER — TELEPHONE (OUTPATIENT)
Age: 64
End: 2024-05-30

## 2024-05-30 NOTE — TELEPHONE ENCOUNTER
Rcvd call from Naye/[employer], states she received the documents from Dr. Reese but they are all invalid because they are not signed by him. States that there is no document containing the settings for the CPAP machine.   They need:   1) Chart note for using CPAP machine and benefiting.  2) Copy of Sleep Study  3) Prescription for CPAP machine with pressure setting.    Please complete and fax back to them . They cannot give patienet a CPAP machine without the correct documentation.

## 2024-06-03 NOTE — TELEPHONE ENCOUNTER
Company called patient this morning and said they didn't receive the forms. Please fax with signature

## 2024-06-06 NOTE — TELEPHONE ENCOUNTER
Naye from [facility] called in stating not  received the requested documents via fax.    1) Chart note for using CPAP machine and how the patient is getting benefited with CPAP.    2) Copy of Sleep Study    3) Prescription for CPAP machine with pressure setting.    [employer] fax @ [phone]    Thanks

## 2024-06-10 DIAGNOSIS — G47.30 SLEEP APNEA, UNSPECIFIED TYPE: Primary | ICD-10-CM

## 2024-06-13 ENCOUNTER — APPOINTMENT (OUTPATIENT)
Dept: LAB | Facility: CLINIC | Age: 64
End: 2024-06-13
Payer: COMMERCIAL

## 2024-06-13 DIAGNOSIS — E78.5 HYPERLIPIDEMIA, UNSPECIFIED HYPERLIPIDEMIA TYPE: ICD-10-CM

## 2024-06-13 DIAGNOSIS — M79.10 MYALGIA: ICD-10-CM

## 2024-06-13 LAB
ALBUMIN SERPL BCP-MCNC: 4.2 G/DL (ref 3.5–5)
ALP SERPL-CCNC: 61 U/L (ref 34–104)
ALT SERPL W P-5'-P-CCNC: 23 U/L (ref 7–52)
AST SERPL W P-5'-P-CCNC: 17 U/L (ref 13–39)
BILIRUB DIRECT SERPL-MCNC: 0.01 MG/DL (ref 0–0.2)
BILIRUB SERPL-MCNC: 0.45 MG/DL (ref 0.2–1)
CHOLEST SERPL-MCNC: 187 MG/DL
HDLC SERPL-MCNC: 70 MG/DL
LDLC SERPL CALC-MCNC: 106 MG/DL (ref 0–100)
PROT SERPL-MCNC: 7.7 G/DL (ref 6.4–8.4)
TRIGL SERPL-MCNC: 57 MG/DL

## 2024-06-13 PROCEDURE — 82552 ASSAY OF CPK IN BLOOD: CPT | Performed by: PHYSICIAN ASSISTANT

## 2024-06-13 PROCEDURE — 82550 ASSAY OF CK (CPK): CPT

## 2024-06-13 PROCEDURE — 36415 COLL VENOUS BLD VENIPUNCTURE: CPT

## 2024-06-13 PROCEDURE — 80076 HEPATIC FUNCTION PANEL: CPT

## 2024-06-13 PROCEDURE — 80061 LIPID PANEL: CPT

## 2024-06-14 ENCOUNTER — OFFICE VISIT (OUTPATIENT)
Dept: SLEEP CENTER | Facility: CLINIC | Age: 64
End: 2024-06-14
Payer: COMMERCIAL

## 2024-06-14 VITALS
WEIGHT: 205 LBS | BODY MASS INDEX: 31.07 KG/M2 | HEIGHT: 68 IN | DIASTOLIC BLOOD PRESSURE: 78 MMHG | SYSTOLIC BLOOD PRESSURE: 135 MMHG

## 2024-06-14 DIAGNOSIS — G47.61 PLMD (PERIODIC LIMB MOVEMENT DISORDER): ICD-10-CM

## 2024-06-14 DIAGNOSIS — R06.5 MOUTH BREATHING: ICD-10-CM

## 2024-06-14 DIAGNOSIS — G47.33 OBSTRUCTIVE SLEEP APNEA SYNDROME: Primary | ICD-10-CM

## 2024-06-14 DIAGNOSIS — E66.9 OBESITY (BMI 30-39.9): ICD-10-CM

## 2024-06-14 PROCEDURE — 99244 OFF/OP CNSLTJ NEW/EST MOD 40: CPT | Performed by: INTERNAL MEDICINE

## 2024-06-14 NOTE — PROGRESS NOTES
Consultation - Sleep Center   Loretta Aguero  63 y.o. female  :1960  MRN:[id number]  DOS:2024    Physician Requesting Consult: Wilfred Reese DO             Reason for Consult : At your kind request I saw Loretta Aguero for initial sleep evaluation today.  She was diagnosed with obstructive sleep apnea in  and prescribed CPAP.  She is here because she needs replacement of the equipment.      A diagnostic study in 2013 demonstrated RDI of 26.2/h.  Minimum oxygen saturation was 85%.  There were also moderate periodic limb movements of sleep.    PFSH, Problem List, Medications & Allergies were reviewed in EMR.    Loretta  has a past medical history of Hypertension and Sleep apnea.      She has a current medication list which includes the following prescription(s): candesartan, cholecalciferol, escitalopram, omega-3 fatty acids, phentermine, rosuvastatin, and tretinoin.      HPI: She has been using CPAP since  and benefits from use.  She has a ResMed machine and experiencing no adverse effects.  Other complaints: None. Restless Leg Syndrome: reports no suggestive symptoms.  .  Parasomnia: no features reported    Sleep Routine (averaged): Typical Bedtime: 11 PM.  Gets OOB: 7 AM. TIB:8 hrs.   Sleep latency:< 15 minutes; Sleep Interruptions: None,. Awakens: Spontaneously  Upon awakening: feels more refreshed since on Rx.  She estimates getting hrs sleep.  Daytime Function:Loretta denies excessive daytime sleepiness. She rated herself at Total score: 0 /24 on the [place] Sleepiness Scale.     Habits:   reports that she quit smoking about 12 years ago. Her smoking use included cigarettes. She has never used smokeless tobacco.;  reports current alcohol use.; Reports no history of drug use.;  E-Cigarette/Vaping  Never User; Caffeine use:limited; Exercise routine: regular.    Occupation:     Family History: Negative for sleep disturbance.  ROS: Significant for some weight gain since her initial  "diagnostic study.  Presently she is on phentermine for weight reduction.  She has nasal symptoms due to seasonal allergies..  She has also had midfacial surgery and has restricted nasal airflow.  She habitually breathes through her mouth.  She is reporting no respiratory or cardiac symptoms.  She feels mood is stable on Lexapro.    EXAM:  /78 (BP Location: Left arm, Patient Position: Sitting, Cuff Size: Large)   Ht 5' 8\" (1.727 m)   Wt 93 kg (205 lb)   BMI 31.17 kg/m²    General: Well groomed female, well appearing, in no apparent distress.   Neurological: Alert and orientated; cooperative; Cranial nerves intact;    Psychiatric: Speech: Clear and coherent; normal mood, affect & thought   Skin: Warm and dry; Color& Hydration good; no facial rashes or lesions   HEENT:  Craniofacial anatomy: normal Sinuses: Non-tender. TMJ: Normal    Eyes: EOM's intact; conjunctiva/corneas clear   Ears: Appear normal     Nasal Airway: airflow is reduced and assymetric nares Septum: Intact; Turbinates: Normal; Rhinorrhea: None  Mouth: Lips: Normal posture; Dentition: normal . Mucosa: Moist; Hard Palate:normal    Oropharryx: crowdedTongue: Mallampati:Class III and MobileSoft Palate:  redundant  Tonsils: absent  Neck:; Neck Circumference: 13.75 \"; supple; no abnormal masses; Thyroid: Normal. Trachea: Central.    Lymph: No cervical Lymhadenopathy  Heart: S1,S2 normal; RRR; no gallop; no murmur   Lungs: Respiratory Effort: Normal. Air entry good bilaterally.  No wheezes.  No rales  Abdomen: Obese, soft & non-tender    Extremities: No pedal edema.  No clubbing or cyanosis.    Musculoskeletal:  Motor normal; Gait: Normal.       IMPRESSION: Primary/Secondary Sleep Diagnoses (to Medical or Psych conditions) & Comorbidities   1. Obstructive sleep apnea syndrome  Ambulatory Referral to Sleep Medicine    Cpap DME      2. PLMD (periodic limb movement disorder)        3. Mouth breathing        4. Obesity (BMI 30-39.9)             PLAN:   1. " "I reviewed results of the Sleep study with the patient.   2. With respect to above conditions, I counseled on pathophysiology, diagnosis, treatment options, risks and benefits; inter-relationship and effects on symptoms and comorbidities; risks of no treatment; costs and insurance aspects.   3. Patient elected to continue positive airway pressure therapy and it is medically necessary.  Pressure settin-15 cm H2O.  4.  No medication is needed for PLMS.  5.  Encouraged to persist with efforts at weight reduction.  6.  I will see her for follow-up in 2 months to monitor progress and to adjust therapy.    Sincerely,      Authenticated electronically on 24   Board Certified Specialist     Portions of the record may have been created with voice recognition software. Occasional wrong word or \"sound a like\" substitutions may have occurred due to the inherent limitations of voice recognition software. There may also be notations and random deletions of words or characters from malfunctioning software. Read the chart carefully and recognize, using context, where substitutions/deletions have occurred.     "

## 2024-06-17 LAB — MISCELLANEOUS LAB TEST RESULT: NORMAL

## 2024-06-19 ENCOUNTER — TELEPHONE (OUTPATIENT)
Dept: SLEEP CENTER | Facility: CLINIC | Age: 64
End: 2024-06-19

## 2024-06-21 LAB
DME PARACHUTE DELIVERY DATE REQUESTED: NORMAL
DME PARACHUTE ITEM DESCRIPTION: NORMAL
DME PARACHUTE ORDER STATUS: NORMAL
DME PARACHUTE SUPPLIER NAME: NORMAL
DME PARACHUTE SUPPLIER PHONE: NORMAL

## 2024-06-25 LAB

## 2024-07-12 LAB

## 2024-07-16 ENCOUNTER — TELEPHONE (OUTPATIENT)
Age: 64
End: 2024-07-16

## 2024-07-16 NOTE — TELEPHONE ENCOUNTER
Pt called in stating that she is switching DME facilities. She is requesting for the CPAP Rx, sleep study and the chart notes to be faxed to [facility] Located [address] [place] [address]. Fax # [phone], ph# [phone] (Mala). Please advise.

## 2024-07-24 LAB

## 2024-09-09 DIAGNOSIS — F32.A DEPRESSION, UNSPECIFIED DEPRESSION TYPE: ICD-10-CM

## 2024-09-09 DIAGNOSIS — F41.9 ANXIETY: ICD-10-CM

## 2024-09-09 RX ORDER — ESCITALOPRAM OXALATE 5 MG/1
5 TABLET ORAL DAILY
Qty: 30 TABLET | Refills: 5 | Status: SHIPPED | OUTPATIENT
Start: 2024-09-09

## 2024-10-25 ENCOUNTER — OFFICE VISIT (OUTPATIENT)
Dept: SLEEP CENTER | Facility: CLINIC | Age: 64
End: 2024-10-25
Payer: COMMERCIAL

## 2024-10-25 VITALS
SYSTOLIC BLOOD PRESSURE: 132 MMHG | DIASTOLIC BLOOD PRESSURE: 81 MMHG | HEIGHT: 68 IN | WEIGHT: 203.6 LBS | BODY MASS INDEX: 30.86 KG/M2

## 2024-10-25 DIAGNOSIS — E66.9 OBESITY (BMI 30-39.9): ICD-10-CM

## 2024-10-25 DIAGNOSIS — F41.9 ANXIETY: ICD-10-CM

## 2024-10-25 DIAGNOSIS — G47.33 OBSTRUCTIVE SLEEP APNEA SYNDROME: Primary | ICD-10-CM

## 2024-10-25 DIAGNOSIS — G47.61 PLMD (PERIODIC LIMB MOVEMENT DISORDER): ICD-10-CM

## 2024-10-25 PROCEDURE — 99214 OFFICE O/P EST MOD 30 MIN: CPT | Performed by: INTERNAL MEDICINE

## 2024-10-25 NOTE — PROGRESS NOTES
Follow-Up Note - [facility]   Loretta Aguero  64 y.o. female  :1960  MRN:[id number]  DOS:10/25/2024    CC: I saw this patient for follow-up in clinic today for Sleep disordered breathing, Coexisting Sleep and Medical Problems.. Interval changes: None reported.      A diagnostic study in  demonstrated RDI of 26.2/h.  Minimum oxygen saturation was 85%.  There were also moderate periodic limb movements of sleep.    PFSH, Problem List, Medications & Allergies were reviewed in EMR.   She  has a past medical history of Hypertension and Sleep apnea.    She has a current medication list which includes the following prescription(s): candesartan, cholecalciferol, escitalopram, omega-3 fatty acids, phentermine, rosuvastatin, and tretinoin.    PHYSIOLOGICAL DATA REVIEW : Using PAP > 4 hours/night 100%. Estimated ELYSE 1.5/hour with pressure of 8.1cm H2O@90th/95th percentile; use is limited by  .  INTERPRETATION: Compliance is excellent; Pressure setting is:optimal; ;   SUBJECTIVE: With respect to use of PAP, Loretta  is experiencing no adverse effects:.She derives benefit..  Feels satisfied with sleep and daytime function.   Sleep Routine: Loretta reports getting (P) 8 hrs sleep; she has no difficulty initiating or maintaining sleep . She arises spontaneously and (P) Always usually feels refreshed.Loretta (P) Rarely]reports daytime sleepiness,.  She rated herself at Total score: 0 /24 on the [place] Sleepiness Scale.   Other issues: Recently she has not noticed jerking limb movements during sleep..     Habits: Reports that she quit smoking about 12 years ago. Her smoking use included cigarettes. She has never used smokeless tobacco.,  Reports current alcohol use.,  Reports no history of drug use., Caffeine use:limited until  ; Exercise routine: regular.      ROS: Significant for few pounds weight reduction.  She is reporting no nasal, respiratory or cardiac symptoms.  Anxiety is controlled on Lexapro..    EXAM:  "/81 (BP Location: Right arm, Patient Position: Sitting, Cuff Size: Standard)   Ht 5' 8\" (1.727 m)   Wt 92.4 kg (203 lb 9.6 oz)   BMI 30.96 kg/m²     Wt Readings from Last 3 Encounters:   10/25/24 92.4 kg (203 lb 9.6 oz)   06/14/24 93 kg (205 lb)   04/17/24 95.7 kg (211 lb)      Patient is well groomed; well appearing.   CNS: Alert, orientated, speech clear & coherent  Psych: cooperative and in no distress. Mental state: Appears normal.  H&N: EOMI; NC/AT: No facial pressure marks, no rashes.    Skin/Extrem: col & hydration normal; no edema  Resp: Respiratory effort is normal  Physical findings otherwise essentially unchanged from previous.    IMPRESSION: Problem List Items & Comorbidities Addressed this Visit    1. Obstructive sleep apnea syndrome  PAP DME Pressure Change    PAP DME Resupply/Reorder      2. PLMD (periodic limb movement disorder)        3. Anxiety        4. Obesity (BMI 30-39.9)            PLAN:  I reviewed results of prior studies and physiologic data with the patient.   I discussed treatment options with risks and benefits.  Treatment with  PAP is medically necessary and Loretta is agreable to continue use.   Care of equipment, methods to improve comfort using PAP and importance of compliance with therapy were discussed.  Pressure setting: change 7-10 cmH2O using nasal pillows.    Rx provided to replace supplies and Care coordinated with DME provider.  No medication is needed for periodic limb movements of sleep  Encouraged to persist with strategies for weight reduction.    Follow-up is advised in 1 year or sooner if needed to monitor progress, compliance and to adjust therapy.     Thank you for allowing me to participate in the care of this patient.    Sincerely,     Authenticated electronically on 10/25/24   Board Certified Specialist     Portions of the record may have been created with voice recognition software. Occasional wrong word or \"sound a like\" substitutions may have occurred " due to the inherent limitations of voice recognition software. There may also be notations and random deletions of words or characters from malfunctioning software. Read the chart carefully and recognize, using context, where substitutions/deletions have occurred.

## 2024-10-26 ENCOUNTER — TELEPHONE (OUTPATIENT)
Dept: SLEEP CENTER | Facility: CLINIC | Age: 64
End: 2024-10-26

## 2024-10-26 LAB

## 2024-10-30 DIAGNOSIS — I10 ESSENTIAL HYPERTENSION: ICD-10-CM

## 2024-10-30 RX ORDER — CANDESARTAN 16 MG/1
TABLET ORAL
Qty: 90 TABLET | Refills: 0 | Status: SHIPPED | OUTPATIENT
Start: 2024-10-30

## 2024-10-31 ENCOUNTER — OFFICE VISIT (OUTPATIENT)
Dept: FAMILY MEDICINE CLINIC | Facility: CLINIC | Age: 64
End: 2024-10-31
Payer: COMMERCIAL

## 2024-10-31 VITALS
WEIGHT: 201 LBS | OXYGEN SATURATION: 97 % | BODY MASS INDEX: 30.46 KG/M2 | HEART RATE: 97 BPM | SYSTOLIC BLOOD PRESSURE: 122 MMHG | HEIGHT: 68 IN | TEMPERATURE: 98.4 F | DIASTOLIC BLOOD PRESSURE: 80 MMHG

## 2024-10-31 DIAGNOSIS — F41.9 ANXIETY: ICD-10-CM

## 2024-10-31 DIAGNOSIS — I10 PRIMARY HYPERTENSION: Primary | ICD-10-CM

## 2024-10-31 DIAGNOSIS — F32.A DEPRESSION, UNSPECIFIED DEPRESSION TYPE: ICD-10-CM

## 2024-10-31 DIAGNOSIS — E55.9 VITAMIN D DEFICIENCY: ICD-10-CM

## 2024-10-31 DIAGNOSIS — E78.5 HYPERLIPIDEMIA, UNSPECIFIED HYPERLIPIDEMIA TYPE: ICD-10-CM

## 2024-10-31 DIAGNOSIS — G47.33 OBSTRUCTIVE SLEEP APNEA SYNDROME: ICD-10-CM

## 2024-10-31 PROCEDURE — 99214 OFFICE O/P EST MOD 30 MIN: CPT | Performed by: FAMILY MEDICINE

## 2024-10-31 NOTE — ASSESSMENT & PLAN NOTE
Orders:    Comprehensive metabolic panel; Future    CBC and differential; Future    Comprehensive metabolic panel    CBC and differential

## 2024-10-31 NOTE — PATIENT INSTRUCTIONS
Here for recheck and takes all meds as directed and anxiety is stable and bp is good, and cholesterol is stable. Losing weight. Rec seeing GYN and gets mammograms as directed. Lose weight as directed to get BMI lower than 25.   
93.94

## 2024-10-31 NOTE — PROGRESS NOTES
Ambulatory Visit  Name: Loretta Aguero      : 1960      MRN: [id number]  Encounter Provider: Wilfred Reese DO  Encounter Date: 10/31/2024   Encounter department: [facility] PRIMARY CARE  Chief Complaint   Patient presents with    Hypertension    Anxiety    Hyperlipidemia     Patient Instructions   Here for recheck and takes all meds as directed and anxiety is stable and bp is good, and cholesterol is stable. Losing weight. Rec seeing GYN and gets mammograms as directed. Lose weight as directed to get BMI lower than 25.     Assessment & Plan  Primary hypertension    Orders:    Comprehensive metabolic panel; Future    Comprehensive metabolic panel    Hyperlipidemia, unspecified hyperlipidemia type    Orders:    Comprehensive metabolic panel; Future    Lipid Panel with Direct LDL reflex; Future    Comprehensive metabolic panel    Lipid Panel with Direct LDL reflex    Obstructive sleep apnea syndrome         Anxiety    Orders:    Comprehensive metabolic panel; Future    CBC and differential; Future    Comprehensive metabolic panel    CBC and differential    Depression, unspecified depression type      Orders:    Comprehensive metabolic panel; Future    CBC and differential; Future    Comprehensive metabolic panel    CBC and differential    Vitamin D deficiency    Orders:    Comprehensive metabolic panel; Future    Vitamin D 25 hydroxy; Future    Comprehensive metabolic panel    Vitamin D 25 hydroxy    [unfilled]  History of Present Illness     Here to recheck BP and cholesterol and anxiety. Doing well and losing weight. Uses CPAP and CRISTELA is improved.     Hypertension  Associated symptoms include anxiety.   Anxiety        Hyperlipidemia        History obtained from : patient  Review of Systems   Constitutional: Negative.    HENT: Negative.     Eyes: Negative.    Respiratory: Negative.     Cardiovascular: Negative.    Gastrointestinal: Negative.    Endocrine: Negative.    Genitourinary:  "Negative.    Musculoskeletal: Negative.    Skin: Negative.    Allergic/Immunologic: Negative.    Neurological: Negative.    Hematological: Negative.    Psychiatric/Behavioral: Negative.       Current Outpatient Medications on File Prior to Visit   Medication Sig Dispense Refill    candesartan (ATACAND) 16 mg tablet take 1 tablet by mouth once daily 90 tablet 0    Cholecalciferol 125 MCG (5000 UT) capsule Take 1 capsule by mouth daily      escitalopram (LEXAPRO) 5 mg tablet Take 1 tablet (5 mg total) by mouth daily 30 tablet 5    Omega-3 Fatty Acids (FISH OIL PO) Take 2 g by mouth daily       phentermine 37.5 MG capsule Take 37.5 mg by mouth every morning Take 1/2 every morning      rosuvastatin (CRESTOR) 5 mg tablet Take 1 tablet (5 mg total) by mouth daily 30 tablet 5    tretinoin (REFISSA) 0.05 % cream APPLY A PEA SIZED AMOUNT TO ENTIRE FACE  EVERY NIGHT AT BEDTIME  ONE HOUR AFTER WASHING FACE       No current facility-administered medications on file prior to visit.          Objective     /80   Pulse 97   Temp 98.4 °F (36.9 °C)   Ht 5' 8\" (1.727 m)   Wt 91.2 kg (201 lb)   SpO2 97%   BMI 30.56 kg/m²     Physical Exam  Constitutional:       Appearance: She is well-developed. She is obese.   HENT:      Head: Normocephalic and atraumatic.      Right Ear: External ear normal.      Left Ear: External ear normal.      Nose: Nose normal.      Mouth/Throat:      Mouth: Mucous membranes are moist.   Eyes:      Conjunctiva/sclera: Conjunctivae normal.      Pupils: Pupils are equal, round, and reactive to light.   Cardiovascular:      Rate and Rhythm: Normal rate and regular rhythm.      Pulses: Normal pulses.      Heart sounds: Normal heart sounds.   Pulmonary:      Effort: Pulmonary effort is normal.      Breath sounds: Normal breath sounds.   Musculoskeletal:         General: Normal range of motion.      Cervical back: Normal range of motion and neck supple.   Skin:     General: Skin is warm and dry.      " Capillary Refill: Capillary refill takes less than 2 seconds.   Neurological:      General: No focal deficit present.      Mental Status: She is alert and oriented to person, place, and time. Mental status is at baseline.      Deep Tendon Reflexes: Reflexes are normal and symmetric.   Psychiatric:         Mood and Affect: Mood normal.         Behavior: Behavior normal.         Thought Content: Thought content normal.         Judgment: Judgment normal.       Administrative Statements   I have spent a total time of 30 minutes in caring for this patient on the day of the visit/encounter including Diagnostic results, Prognosis, Risks and benefits of tx options, Instructions for management, Patient and family education, Importance of tx compliance, Risk factor reductions, Impressions, Counseling / Coordination of care, Documenting in the medical record, Reviewing / ordering tests, medicine, procedures  , and Obtaining or reviewing history  .

## 2024-11-08 LAB

## 2024-11-11 ENCOUNTER — TELEPHONE (OUTPATIENT)
Age: 64
End: 2024-11-11

## 2024-11-11 NOTE — TELEPHONE ENCOUNTER
Return call to patient left voicemail message for her to call or send MyChart message for her to confirm she would like to switch DME provider to [facility].

## 2024-11-11 NOTE — TELEPHONE ENCOUNTER
Patient called in she received a message from [employer] in regards to CPAP machine however she is using [facility] and wanted to know if script for CPAP could be sent to [facility]

## 2024-11-14 DIAGNOSIS — I10 ESSENTIAL HYPERTENSION: ICD-10-CM

## 2024-11-14 RX ORDER — CANDESARTAN 16 MG/1
16 TABLET ORAL DAILY
Qty: 90 TABLET | Refills: 0 | Status: SHIPPED | OUTPATIENT
Start: 2024-11-14

## 2024-11-14 NOTE — TELEPHONE ENCOUNTER
CPAP resupply and pressure change scripts along with office visit notes sent to [facility] via [employer].

## 2025-01-10 LAB

## 2025-05-02 ENCOUNTER — RA CDI HCC (OUTPATIENT)
Dept: OTHER | Facility: HOSPITAL | Age: 65
End: 2025-05-02

## 2025-05-02 LAB
25(OH)D3 SERPL-MCNC: 52 NG/ML (ref 30–100)
ALBUMIN SERPL-MCNC: 4.4 G/DL (ref 3.6–5.1)
ALBUMIN/GLOB SERPL: 1.3 (CALC) (ref 1–2.5)
ALP SERPL-CCNC: 71 U/L (ref 37–153)
ALT SERPL-CCNC: 18 U/L (ref 6–29)
AST SERPL-CCNC: 15 U/L (ref 10–35)
BASOPHILS # BLD AUTO: 41 CELLS/UL (ref 0–200)
BASOPHILS NFR BLD AUTO: 0.9 %
BILIRUB SERPL-MCNC: 0.4 MG/DL (ref 0.2–1.2)
BUN SERPL-MCNC: 15 MG/DL (ref 7–25)
BUN/CREAT SERPL: NORMAL (CALC) (ref 6–22)
CALCIUM SERPL-MCNC: 9.5 MG/DL (ref 8.6–10.4)
CHLORIDE SERPL-SCNC: 105 MMOL/L (ref 98–110)
CHOLEST SERPL-MCNC: 282 MG/DL
CHOLEST/HDLC SERPL: 3.9 (CALC)
CO2 SERPL-SCNC: 28 MMOL/L (ref 20–32)
CREAT SERPL-MCNC: 0.76 MG/DL (ref 0.5–1.05)
EOSINOPHIL # BLD AUTO: 68 CELLS/UL (ref 15–500)
EOSINOPHIL NFR BLD AUTO: 1.5 %
ERYTHROCYTE [DISTWIDTH] IN BLOOD BY AUTOMATED COUNT: 12.8 % (ref 11–15)
GFR/BSA.PRED SERPLBLD CYS-BASED-ARV: 89 ML/MIN/1.73M2
GLOBULIN SER CALC-MCNC: 3.4 G/DL (CALC) (ref 1.9–3.7)
GLUCOSE SERPL-MCNC: 88 MG/DL (ref 65–99)
HCT VFR BLD AUTO: 39.3 % (ref 35–45)
HDLC SERPL-MCNC: 73 MG/DL
HGB BLD-MCNC: 13.7 G/DL (ref 11.7–15.5)
LDLC SERPL CALC-MCNC: 191 MG/DL (CALC)
LYMPHOCYTES # BLD AUTO: 1823 CELLS/UL (ref 850–3900)
LYMPHOCYTES NFR BLD AUTO: 40.5 %
MCH RBC QN AUTO: 33.1 PG (ref 27–33)
MCHC RBC AUTO-ENTMCNC: 34.9 G/DL (ref 32–36)
MCV RBC AUTO: 94.9 FL (ref 80–100)
MONOCYTES # BLD AUTO: 288 CELLS/UL (ref 200–950)
MONOCYTES NFR BLD AUTO: 6.4 %
NEUTROPHILS # BLD AUTO: 2282 CELLS/UL (ref 1500–7800)
NEUTROPHILS NFR BLD AUTO: 50.7 %
NONHDLC SERPL-MCNC: 209 MG/DL (CALC)
PLATELET # BLD AUTO: 143 THOUSAND/UL (ref 140–400)
PMV BLD REES-ECKER: 12.2 FL (ref 7.5–12.5)
POTASSIUM SERPL-SCNC: 4.2 MMOL/L (ref 3.5–5.3)
PROT SERPL-MCNC: 7.8 G/DL (ref 6.1–8.1)
RBC # BLD AUTO: 4.14 MILLION/UL (ref 3.8–5.1)
SODIUM SERPL-SCNC: 141 MMOL/L (ref 135–146)
TRIGL SERPL-MCNC: 77 MG/DL
WBC # BLD AUTO: 4.5 THOUSAND/UL (ref 3.8–10.8)

## 2025-05-08 ENCOUNTER — OFFICE VISIT (OUTPATIENT)
Dept: FAMILY MEDICINE CLINIC | Facility: CLINIC | Age: 65
End: 2025-05-08
Payer: COMMERCIAL

## 2025-05-08 VITALS
TEMPERATURE: 96.5 F | WEIGHT: 191 LBS | RESPIRATION RATE: 16 BRPM | DIASTOLIC BLOOD PRESSURE: 82 MMHG | BODY MASS INDEX: 28.29 KG/M2 | SYSTOLIC BLOOD PRESSURE: 138 MMHG | HEART RATE: 91 BPM | OXYGEN SATURATION: 98 % | HEIGHT: 69 IN

## 2025-05-08 DIAGNOSIS — F41.9 ANXIETY: ICD-10-CM

## 2025-05-08 DIAGNOSIS — Z00.00 HEALTH CARE MAINTENANCE: Primary | ICD-10-CM

## 2025-05-08 DIAGNOSIS — G47.33 OBSTRUCTIVE SLEEP APNEA SYNDROME: ICD-10-CM

## 2025-05-08 DIAGNOSIS — F32.A DEPRESSION, UNSPECIFIED DEPRESSION TYPE: ICD-10-CM

## 2025-05-08 DIAGNOSIS — E66.3 OVERWEIGHT (BMI 25.0-29.9): ICD-10-CM

## 2025-05-08 DIAGNOSIS — I10 PRIMARY HYPERTENSION: ICD-10-CM

## 2025-05-08 DIAGNOSIS — E78.5 HYPERLIPIDEMIA, UNSPECIFIED HYPERLIPIDEMIA TYPE: ICD-10-CM

## 2025-05-08 DIAGNOSIS — E55.9 VITAMIN D DEFICIENCY: ICD-10-CM

## 2025-05-08 PROCEDURE — 99396 PREV VISIT EST AGE 40-64: CPT | Performed by: FAMILY MEDICINE

## 2025-05-08 RX ORDER — ROSUVASTATIN CALCIUM 5 MG/1
5 TABLET, COATED ORAL DAILY
Qty: 30 TABLET | Refills: 7 | Status: SHIPPED | OUTPATIENT
Start: 2025-05-08

## 2025-05-08 NOTE — PROGRESS NOTES
Name: Loretta Aguero      : 1960      MRN: [id number]  Encounter Provider: Wilfred Reese DO  Encounter Date: 2025   Encounter department: [facility] PRIMARY CARE  Chief Complaint   Patient presents with    Follow-up     6 month follow up lab results      Patient Instructions   Here for general PE and rec seeing GYN and get mammograms and colon screenings are UTD and rec rechecking lipids after restarting Crestor 5 mg daily. Patient takes phentermine by Dr. Haley. Has lost weight. Vitamin dis in range. Use CPAP as directed.     Assessment & Plan  Health care maintenance  Stable and colon screenings and mammograms and cervical screenings are UTD       Primary hypertension  Stable on med  Orders:    Comprehensive metabolic panel; Future    Hyperlipidemia, unspecified hyperlipidemia type  Restart Crestor 5 mg daily  Orders:    rosuvastatin (CRESTOR) 5 mg tablet; Take 1 tablet (5 mg total) by mouth daily    Comprehensive metabolic panel; Future    Lipid Panel with Direct LDL reflex; Future    Vitamin D deficiency  Take Vitamin D3 as directed.        Anxiety  Stable off med        Depression, unspecified depression type  Stable and sees counselor         Obstructive sleep apnea syndrome  Uses CPAP       Overweight (BMI 25.0-29.9)  Lose weight via diet and exercise and sees Dr. Haley and is on phentermine           Depression Screening and Follow-up Plan: Patient was screened for depression during today's encounter. They screened negative with a PHQ-9 score of 0.          History of Present Illness     Here for general PE and takes all meds as directed. Stopped crestor 2 months ago. Uses sunscreen and monitors for ticks.  and is a [employer]  and has 1 child. Non smoker and drinks alcohol social drinker 2 bottles wine per week. Sees gyn and gets mammograms. Colon screening is UTD. Anxiety and depression stable and sees counselor every 2 weeks and is now off med.  Use CPAP as directed. Sees dentist, sees eye doctor as directed. Gets 8 hours sleep per night.       Review of Systems   Constitutional: Negative.    HENT: Negative.     Eyes: Negative.    Respiratory: Negative.     Cardiovascular: Negative.    Gastrointestinal: Negative.    Endocrine: Negative.    Genitourinary: Negative.    Musculoskeletal: Negative.    Skin: Negative.    Allergic/Immunologic: Negative.    Neurological: Negative.    Hematological: Negative.    Psychiatric/Behavioral:          Anxiety and depression stable on counseling     Past Medical History:   Diagnosis Date    Hypertension     Sleep apnea      Past Surgical History:   Procedure Laterality Date    ADENOIDECTOMY      BUNIONECTOMY      NASAL SEPTUM SURGERY      RHINOPLASTY      TONSILLECTOMY       Family History   Problem Relation Age of Onset    Diabetes Mother     Hypertension Mother     Lung cancer Father     Cancer Neg Hx     Heart disease Neg Hx     Thyroid disease Neg Hx     Stroke Neg Hx      Social History     Tobacco Use    Smoking status: Former     Current packs/day: 0.00     Types: Cigarettes     Quit date:      Years since quittin.3    Smokeless tobacco: Never   Vaping Use    Vaping status: Never Used   Substance and Sexual Activity    Alcohol use: Yes     Comment: socially    Drug use: No    Sexual activity: Not on file     Current Outpatient Medications on File Prior to Visit   Medication Sig    candesartan (ATACAND) 16 mg tablet take 1 tablet by mouth once daily    Cholecalciferol 125 MCG (5000 UT) capsule Take 1 capsule by mouth daily    Omega-3 Fatty Acids (FISH OIL PO) Take 2 g by mouth daily     phentermine 37.5 MG capsule Take 37.5 mg by mouth every morning Take 1/2 every morning    tretinoin (REFISSA) 0.05 % cream APPLY A PEA SIZED AMOUNT TO ENTIRE FACE  EVERY NIGHT AT BEDTIME  ONE HOUR AFTER WASHING FACE    [DISCONTINUED] rosuvastatin (CRESTOR) 5 mg tablet Take 1 tablet (5 mg total) by mouth daily    [DISCONTINUED]  "escitalopram (LEXAPRO) 5 mg tablet Take 1 tablet (5 mg total) by mouth daily (Patient not taking: Reported on 5/8/2025)     Allergies   Allergen Reactions    Ampicillin Hives     Immunization History   Administered Date(s) Administered    COVID-19 PFIZER VACCINE 0.3 ML IM 03/04/2021, 03/25/2021, 12/06/2021    INFLUENZA 10/27/2015, 10/19/2016, 10/23/2017    Influenza, recombinant, quadrivalent,injectable, preservative free 10/22/2019    Influenza, seasonal, injectable 01/10/2014    Tdap 02/01/2018     Objective   /82   Pulse 91   Temp (!) 96.5 °F (35.8 °C) (Temporal)   Resp 16   Ht 5' 8.5\" (1.74 m)   Wt 86.6 kg (191 lb)   SpO2 98%   BMI 28.62 kg/m²     Physical Exam  Constitutional:       Appearance: She is well-developed.      Comments: overweight   HENT:      Head: Normocephalic and atraumatic.      Right Ear: External ear normal.      Left Ear: External ear normal.      Nose: Nose normal.      Mouth/Throat:      Mouth: Mucous membranes are moist.   Eyes:      Conjunctiva/sclera: Conjunctivae normal.      Pupils: Pupils are equal, round, and reactive to light.   Cardiovascular:      Rate and Rhythm: Normal rate and regular rhythm.      Pulses: Normal pulses.      Heart sounds: Normal heart sounds.   Pulmonary:      Effort: Pulmonary effort is normal.      Breath sounds: Normal breath sounds.   Abdominal:      General: Abdomen is flat. Bowel sounds are normal.      Palpations: Abdomen is soft.   Musculoskeletal:         General: Normal range of motion.      Cervical back: Normal range of motion and neck supple.   Skin:     General: Skin is warm and dry.      Capillary Refill: Capillary refill takes less than 2 seconds.   Neurological:      General: No focal deficit present.      Mental Status: She is alert and oriented to person, place, and time. Mental status is at baseline.      Deep Tendon Reflexes: Reflexes are normal and symmetric.   Psychiatric:         Mood and Affect: Mood normal.         " Behavior: Behavior normal.         Thought Content: Thought content normal.         Judgment: Judgment normal.      Comments: Anxiety and depression stable off med and sees counseling       Administrative Statements   I have spent a total time of 34 minutes in caring for this patient on the day of the visit/encounter including Diagnostic results, Prognosis, Risks and benefits of tx options, Instructions for management, Patient and family education, Importance of tx compliance, Risk factor reductions, Impressions, Counseling / Coordination of care, Documenting in the medical record, Reviewing/placing orders in the medical record (including tests, medications, and/or procedures), and Obtaining or reviewing history  .

## 2025-05-08 NOTE — ASSESSMENT & PLAN NOTE
Restart Crestor 5 mg daily  Orders:    rosuvastatin (CRESTOR) 5 mg tablet; Take 1 tablet (5 mg total) by mouth daily    Comprehensive metabolic panel; Future    Lipid Panel with Direct LDL reflex; Future

## 2025-05-08 NOTE — PATIENT INSTRUCTIONS
Here for general PE and rec seeing GYN and get mammograms and colon screenings are UTD and rec rechecking lipids after restarting Crestor 5 mg daily. Patient takes phentermine by Dr. Haley. Has lost weight. Vitamin dis in range. Use CPAP as directed.

## 2025-05-11 DIAGNOSIS — I10 ESSENTIAL HYPERTENSION: ICD-10-CM

## 2025-05-11 RX ORDER — CANDESARTAN 16 MG/1
16 TABLET ORAL DAILY
Qty: 90 TABLET | Refills: 1 | Status: SHIPPED | OUTPATIENT
Start: 2025-05-11

## 2025-07-17 ENCOUNTER — TELEPHONE (OUTPATIENT)
Age: 65
End: 2025-07-17